# Patient Record
Sex: MALE | Employment: FULL TIME | URBAN - METROPOLITAN AREA
[De-identification: names, ages, dates, MRNs, and addresses within clinical notes are randomized per-mention and may not be internally consistent; named-entity substitution may affect disease eponyms.]

---

## 2018-09-13 ENCOUNTER — HOSPITAL ENCOUNTER (EMERGENCY)
Age: 48
Discharge: HOME | End: 2018-09-13
Attending: EMERGENCY MEDICINE
Payer: COMMERCIAL

## 2018-09-13 ENCOUNTER — APPOINTMENT (OUTPATIENT)
Dept: CT IMAGING | Age: 48
End: 2018-09-13
Attending: EMERGENCY MEDICINE
Payer: COMMERCIAL

## 2018-09-13 VITALS
TEMPERATURE: 97.7 F | OXYGEN SATURATION: 98 % | RESPIRATION RATE: 16 BRPM | DIASTOLIC BLOOD PRESSURE: 78 MMHG | SYSTOLIC BLOOD PRESSURE: 138 MMHG | WEIGHT: 180 LBS | BODY MASS INDEX: 25.77 KG/M2 | HEIGHT: 70 IN | HEART RATE: 65 BPM

## 2018-09-13 DIAGNOSIS — N20.0 KIDNEY STONE: Primary | ICD-10-CM

## 2018-09-13 LAB
ALBUMIN SERPL-MCNC: 3.8 G/DL (ref 3.5–5)
ALBUMIN/GLOB SERPL: 1.2 {RATIO} (ref 1.1–2.2)
ALP SERPL-CCNC: 72 U/L (ref 45–117)
ALT SERPL-CCNC: 27 U/L (ref 12–78)
ANION GAP SERPL CALC-SCNC: 11 MMOL/L (ref 5–15)
APPEARANCE UR: ABNORMAL
AST SERPL-CCNC: 16 U/L (ref 15–37)
BACTERIA URNS QL MICRO: NEGATIVE /HPF
BASOPHILS # BLD: 0.1 K/UL (ref 0–0.1)
BASOPHILS NFR BLD: 1 % (ref 0–1)
BILIRUB SERPL-MCNC: 0.2 MG/DL (ref 0.2–1)
BILIRUB UR QL: NEGATIVE
BUN SERPL-MCNC: 20 MG/DL (ref 6–20)
BUN/CREAT SERPL: 19 (ref 12–20)
CALCIUM SERPL-MCNC: 9.1 MG/DL (ref 8.5–10.1)
CHLORIDE SERPL-SCNC: 106 MMOL/L (ref 97–108)
CO2 SERPL-SCNC: 24 MMOL/L (ref 21–32)
COLOR UR: ABNORMAL
COMMENT, HOLDF: NORMAL
CREAT SERPL-MCNC: 1.04 MG/DL (ref 0.7–1.3)
DIFFERENTIAL METHOD BLD: NORMAL
EOSINOPHIL # BLD: 0.2 K/UL (ref 0–0.4)
EOSINOPHIL NFR BLD: 3 % (ref 0–7)
EPITH CASTS URNS QL MICRO: ABNORMAL /LPF
ERYTHROCYTE [DISTWIDTH] IN BLOOD BY AUTOMATED COUNT: 12.2 % (ref 11.5–14.5)
GLOBULIN SER CALC-MCNC: 3.2 G/DL (ref 2–4)
GLUCOSE SERPL-MCNC: 92 MG/DL (ref 65–100)
GLUCOSE UR STRIP.AUTO-MCNC: NEGATIVE MG/DL
HCT VFR BLD AUTO: 42.2 % (ref 36.6–50.3)
HGB BLD-MCNC: 13.9 G/DL (ref 12.1–17)
HGB UR QL STRIP: ABNORMAL
HYALINE CASTS URNS QL MICRO: ABNORMAL /LPF (ref 0–5)
IMM GRANULOCYTES # BLD: 0 K/UL (ref 0–0.04)
IMM GRANULOCYTES NFR BLD AUTO: 0 % (ref 0–0.5)
KETONES UR QL STRIP.AUTO: NEGATIVE MG/DL
LEUKOCYTE ESTERASE UR QL STRIP.AUTO: NEGATIVE
LYMPHOCYTES # BLD: 2.2 K/UL (ref 0.8–3.5)
LYMPHOCYTES NFR BLD: 37 % (ref 12–49)
MCH RBC QN AUTO: 30.8 PG (ref 26–34)
MCHC RBC AUTO-ENTMCNC: 32.9 G/DL (ref 30–36.5)
MCV RBC AUTO: 93.6 FL (ref 80–99)
MONOCYTES # BLD: 0.6 K/UL (ref 0–1)
MONOCYTES NFR BLD: 10 % (ref 5–13)
NEUTS SEG # BLD: 2.9 K/UL (ref 1.8–8)
NEUTS SEG NFR BLD: 49 % (ref 32–75)
NITRITE UR QL STRIP.AUTO: NEGATIVE
NRBC # BLD: 0 K/UL (ref 0–0.01)
NRBC BLD-RTO: 0 PER 100 WBC
PH UR STRIP: 5.5 [PH] (ref 5–8)
PLATELET # BLD AUTO: 246 K/UL (ref 150–400)
PMV BLD AUTO: 10.1 FL (ref 8.9–12.9)
POTASSIUM SERPL-SCNC: 4.1 MMOL/L (ref 3.5–5.1)
PROT SERPL-MCNC: 7 G/DL (ref 6.4–8.2)
PROT UR STRIP-MCNC: ABNORMAL MG/DL
RBC # BLD AUTO: 4.51 M/UL (ref 4.1–5.7)
RBC #/AREA URNS HPF: >100 /HPF (ref 0–5)
SAMPLES BEING HELD,HOLD: NORMAL
SODIUM SERPL-SCNC: 141 MMOL/L (ref 136–145)
SP GR UR REFRACTOMETRY: 1.02 (ref 1–1.03)
UR CULT HOLD, URHOLD: NORMAL
UROBILINOGEN UR QL STRIP.AUTO: 0.2 EU/DL (ref 0.2–1)
WBC # BLD AUTO: 5.9 K/UL (ref 4.1–11.1)
WBC URNS QL MICRO: ABNORMAL /HPF (ref 0–4)

## 2018-09-13 PROCEDURE — 36415 COLL VENOUS BLD VENIPUNCTURE: CPT | Performed by: EMERGENCY MEDICINE

## 2018-09-13 PROCEDURE — 81001 URINALYSIS AUTO W/SCOPE: CPT | Performed by: EMERGENCY MEDICINE

## 2018-09-13 PROCEDURE — 74011250636 HC RX REV CODE- 250/636: Performed by: EMERGENCY MEDICINE

## 2018-09-13 PROCEDURE — 99283 EMERGENCY DEPT VISIT LOW MDM: CPT

## 2018-09-13 PROCEDURE — 96374 THER/PROPH/DIAG INJ IV PUSH: CPT

## 2018-09-13 PROCEDURE — 74011250637 HC RX REV CODE- 250/637: Performed by: EMERGENCY MEDICINE

## 2018-09-13 PROCEDURE — 80053 COMPREHEN METABOLIC PANEL: CPT | Performed by: EMERGENCY MEDICINE

## 2018-09-13 PROCEDURE — 96361 HYDRATE IV INFUSION ADD-ON: CPT

## 2018-09-13 PROCEDURE — 96375 TX/PRO/DX INJ NEW DRUG ADDON: CPT

## 2018-09-13 PROCEDURE — 85025 COMPLETE CBC W/AUTO DIFF WBC: CPT | Performed by: EMERGENCY MEDICINE

## 2018-09-13 PROCEDURE — 74176 CT ABD & PELVIS W/O CONTRAST: CPT

## 2018-09-13 RX ORDER — ONDANSETRON 8 MG/1
8 TABLET, ORALLY DISINTEGRATING ORAL
Qty: 15 TAB | Refills: 0 | Status: SHIPPED | OUTPATIENT
Start: 2018-09-13 | End: 2018-10-16

## 2018-09-13 RX ORDER — TAMSULOSIN HYDROCHLORIDE 0.4 MG/1
0.4 CAPSULE ORAL DAILY
Qty: 15 CAP | Refills: 0 | Status: SHIPPED | OUTPATIENT
Start: 2018-09-13 | End: 2018-09-28

## 2018-09-13 RX ORDER — KETOROLAC TROMETHAMINE 30 MG/ML
30 INJECTION, SOLUTION INTRAMUSCULAR; INTRAVENOUS
Status: COMPLETED | OUTPATIENT
Start: 2018-09-13 | End: 2018-09-13

## 2018-09-13 RX ORDER — TAMSULOSIN HYDROCHLORIDE 0.4 MG/1
0.4 CAPSULE ORAL
Status: COMPLETED | OUTPATIENT
Start: 2018-09-13 | End: 2018-09-13

## 2018-09-13 RX ORDER — ONDANSETRON 2 MG/ML
8 INJECTION INTRAMUSCULAR; INTRAVENOUS
Status: COMPLETED | OUTPATIENT
Start: 2018-09-13 | End: 2018-09-13

## 2018-09-13 RX ORDER — HYDROCODONE BITARTRATE AND ACETAMINOPHEN 5; 325 MG/1; MG/1
1-2 TABLET ORAL
Qty: 20 TAB | Refills: 0 | Status: ON HOLD | OUTPATIENT
Start: 2018-09-13 | End: 2018-10-18 | Stop reason: SDUPTHER

## 2018-09-13 RX ADMIN — SODIUM CHLORIDE 1000 ML: 900 INJECTION, SOLUTION INTRAVENOUS at 02:02

## 2018-09-13 RX ADMIN — KETOROLAC TROMETHAMINE 30 MG: 30 INJECTION, SOLUTION INTRAMUSCULAR at 02:02

## 2018-09-13 RX ADMIN — ONDANSETRON 8 MG: 2 INJECTION INTRAMUSCULAR; INTRAVENOUS at 02:02

## 2018-09-13 RX ADMIN — TAMSULOSIN HYDROCHLORIDE 0.4 MG: 0.4 CAPSULE ORAL at 03:03

## 2018-09-13 NOTE — DISCHARGE INSTRUCTIONS
Kidney Stone: Care Instructions  Your Care Instructions    Kidney stones are formed when salts, minerals, and other substances normally found in the urine clump together. They can be as small as grains of sand or, rarely, as large as golf balls. While the stone is traveling through the ureter, which is the tube that carries urine from the kidney to the bladder, you will probably feel pain. The pain may be mild or very severe. You may also have some blood in your urine. As soon as the stone reaches the bladder, any intense pain should go away. If a stone is too large to pass on its own, you may need a medical procedure to help you pass the stone. The doctor has checked you carefully, but problems can develop later. If you notice any problems or new symptoms, get medical treatment right away. Follow-up care is a key part of your treatment and safety. Be sure to make and go to all appointments, and call your doctor if you are having problems. It's also a good idea to know your test results and keep a list of the medicines you take. How can you care for yourself at home? · Drink plenty of fluids, enough so that your urine is light yellow or clear like water. If you have kidney, heart, or liver disease and have to limit fluids, talk with your doctor before you increase the amount of fluids you drink. · Take pain medicines exactly as directed. Call your doctor if you think you are having a problem with your medicine. ¨ If the doctor gave you a prescription medicine for pain, take it as prescribed. ¨ If you are not taking a prescription pain medicine, ask your doctor if you can take an over-the-counter medicine. Read and follow all instructions on the label. · Your doctor may ask you to strain your urine so that you can collect your kidney stone when it passes. You can use a kitchen strainer or a tea strainer to catch the stone. Store it in a plastic bag until you see your doctor again.   Preventing future kidney stones  Some changes in your diet may help prevent kidney stones. Depending on the cause of your stones, your doctor may recommend that you:  · Drink plenty of fluids, enough so that your urine is light yellow or clear like water. If you have kidney, heart, or liver disease and have to limit fluids, talk with your doctor before you increase the amount of fluids you drink. · Limit coffee, tea, and alcohol. Also avoid grapefruit juice. · Do not take more than the recommended daily dose of vitamins C and D.  · Avoid antacids such as Gaviscon, Maalox, Mylanta, or Tums. · Limit the amount of salt (sodium) in your diet. · Eat a balanced diet that is not too high in protein. · Limit foods that are high in a substance called oxalate, which can cause kidney stones. These foods include dark green vegetables, rhubarb, chocolate, wheat bran, nuts, cranberries, and beans. When should you call for help? Call your doctor now or seek immediate medical care if:    · You cannot keep down fluids.     · Your pain gets worse.     · You have a fever or chills.     · You have new or worse pain in your back just below your rib cage (the flank area).     · You have new or more blood in your urine.    Watch closely for changes in your health, and be sure to contact your doctor if:    · You do not get better as expected. Where can you learn more? Go to http://derek-david.info/. Enter P714 in the search box to learn more about \"Kidney Stone: Care Instructions. \"  Current as of: May 12, 2017  Content Version: 11.7  © 9673-0470 [employer]. Care instructions adapted under license by [employer] (which disclaims liability or warranty for this information). If you have questions about a medical condition or this instruction, always ask your healthcare professional. [address] any warranty or liability for your use of this information.

## 2018-09-13 NOTE — ED NOTES
9802:  MD reviewed discharge instructions and options with patient and patient verbalized understanding. RN reviewed discharge instructions using teachback method. Pt ambulated to exit without difficulty and in no signs of acute distress escorted by self, and self will drive home. No complaints or needs expressed at this time. Patient was counseled on medications prescribed at discharge. VSS, verbalized relief from most intense pain. Patient to call PCP in the morning for appointment.

## 2018-09-13 NOTE — ED TRIAGE NOTES
Patient presents to the emergency department reporting left sided flank pain onset a couple of hours ago. Patient reports history of kidney stones. Patient reports nausea without vomiting. Patient denies urinary symptoms.

## 2018-10-16 RX ORDER — CITALOPRAM 20 MG/1
20 TABLET, FILM COATED ORAL DAILY
COMMUNITY

## 2018-10-18 ENCOUNTER — ANESTHESIA EVENT (OUTPATIENT)
Dept: SURGERY | Age: 48
End: 2018-10-18
Payer: COMMERCIAL

## 2018-10-18 ENCOUNTER — ANESTHESIA (OUTPATIENT)
Dept: SURGERY | Age: 48
End: 2018-10-18
Payer: COMMERCIAL

## 2018-10-18 ENCOUNTER — HOSPITAL ENCOUNTER (OUTPATIENT)
Age: 48
Setting detail: OUTPATIENT SURGERY
Discharge: HOME | End: 2018-10-18
Attending: UROLOGY | Admitting: UROLOGY
Payer: COMMERCIAL

## 2018-10-18 VITALS
SYSTOLIC BLOOD PRESSURE: 120 MMHG | WEIGHT: 169.97 LBS | DIASTOLIC BLOOD PRESSURE: 67 MMHG | HEIGHT: 69 IN | BODY MASS INDEX: 25.18 KG/M2 | HEART RATE: 63 BPM | OXYGEN SATURATION: 98 % | TEMPERATURE: 97.9 F | RESPIRATION RATE: 11 BRPM

## 2018-10-18 DIAGNOSIS — N20.0 KIDNEY STONE: ICD-10-CM

## 2018-10-18 PROCEDURE — 76210000021 HC REC RM PH II 0.5 TO 1 HR: Performed by: UROLOGY

## 2018-10-18 PROCEDURE — 77030032490 HC SLV COMPR SCD KNE COVD -B: Performed by: UROLOGY

## 2018-10-18 PROCEDURE — 76060000033 HC ANESTHESIA 1 TO 1.5 HR: Performed by: UROLOGY

## 2018-10-18 PROCEDURE — 74011250636 HC RX REV CODE- 250/636: Performed by: ANESTHESIOLOGY

## 2018-10-18 PROCEDURE — 77030020782 HC GWN BAIR PAWS FLX 3M -B

## 2018-10-18 PROCEDURE — 74011250637 HC RX REV CODE- 250/637: Performed by: UROLOGY

## 2018-10-18 PROCEDURE — 76010000161 HC OR TIME 1 TO 1.5 HR INTENSV-TIER 1: Performed by: UROLOGY

## 2018-10-18 PROCEDURE — 74011250636 HC RX REV CODE- 250/636: Performed by: UROLOGY

## 2018-10-18 PROCEDURE — 76210000016 HC OR PH I REC 1 TO 1.5 HR: Performed by: UROLOGY

## 2018-10-18 PROCEDURE — 74011250636 HC RX REV CODE- 250/636

## 2018-10-18 RX ORDER — SODIUM CHLORIDE 0.9 % (FLUSH) 0.9 %
5-10 SYRINGE (ML) INJECTION EVERY 8 HOURS
Status: DISCONTINUED | OUTPATIENT
Start: 2018-10-18 | End: 2018-10-18 | Stop reason: HOSPADM

## 2018-10-18 RX ORDER — SODIUM CHLORIDE 9 MG/ML
50 INJECTION, SOLUTION INTRAVENOUS CONTINUOUS
Status: DISCONTINUED | OUTPATIENT
Start: 2018-10-18 | End: 2018-10-18 | Stop reason: HOSPADM

## 2018-10-18 RX ORDER — PROPOFOL 10 MG/ML
INJECTION, EMULSION INTRAVENOUS AS NEEDED
Status: DISCONTINUED | OUTPATIENT
Start: 2018-10-18 | End: 2018-10-18 | Stop reason: HOSPADM

## 2018-10-18 RX ORDER — OXYCODONE AND ACETAMINOPHEN 5; 325 MG/1; MG/1
1 TABLET ORAL AS NEEDED
Status: DISCONTINUED | OUTPATIENT
Start: 2018-10-18 | End: 2018-10-18 | Stop reason: HOSPADM

## 2018-10-18 RX ORDER — MIDAZOLAM HYDROCHLORIDE 1 MG/ML
INJECTION, SOLUTION INTRAMUSCULAR; INTRAVENOUS AS NEEDED
Status: DISCONTINUED | OUTPATIENT
Start: 2018-10-18 | End: 2018-10-18

## 2018-10-18 RX ORDER — ONDANSETRON 2 MG/ML
INJECTION INTRAMUSCULAR; INTRAVENOUS AS NEEDED
Status: DISCONTINUED | OUTPATIENT
Start: 2018-10-18 | End: 2018-10-18 | Stop reason: HOSPADM

## 2018-10-18 RX ORDER — LIDOCAINE HYDROCHLORIDE 10 MG/ML
0.1 INJECTION, SOLUTION EPIDURAL; INFILTRATION; INTRACAUDAL; PERINEURAL AS NEEDED
Status: DISCONTINUED | OUTPATIENT
Start: 2018-10-18 | End: 2018-10-18 | Stop reason: HOSPADM

## 2018-10-18 RX ORDER — MIDAZOLAM HYDROCHLORIDE 1 MG/ML
1 INJECTION, SOLUTION INTRAMUSCULAR; INTRAVENOUS AS NEEDED
Status: DISCONTINUED | OUTPATIENT
Start: 2018-10-18 | End: 2018-10-18 | Stop reason: HOSPADM

## 2018-10-18 RX ORDER — LIDOCAINE HYDROCHLORIDE 20 MG/ML
INJECTION, SOLUTION EPIDURAL; INFILTRATION; INTRACAUDAL; PERINEURAL AS NEEDED
Status: DISCONTINUED | OUTPATIENT
Start: 2018-10-18 | End: 2018-10-18 | Stop reason: HOSPADM

## 2018-10-18 RX ORDER — DEXAMETHASONE SODIUM PHOSPHATE 4 MG/ML
INJECTION, SOLUTION INTRA-ARTICULAR; INTRALESIONAL; INTRAMUSCULAR; INTRAVENOUS; SOFT TISSUE AS NEEDED
Status: DISCONTINUED | OUTPATIENT
Start: 2018-10-18 | End: 2018-10-18 | Stop reason: HOSPADM

## 2018-10-18 RX ORDER — MIDAZOLAM HYDROCHLORIDE 1 MG/ML
0.5 INJECTION, SOLUTION INTRAMUSCULAR; INTRAVENOUS
Status: DISCONTINUED | OUTPATIENT
Start: 2018-10-18 | End: 2018-10-18 | Stop reason: HOSPADM

## 2018-10-18 RX ORDER — FENTANYL CITRATE 50 UG/ML
INJECTION, SOLUTION INTRAMUSCULAR; INTRAVENOUS AS NEEDED
Status: DISCONTINUED | OUTPATIENT
Start: 2018-10-18 | End: 2018-10-18 | Stop reason: HOSPADM

## 2018-10-18 RX ORDER — SODIUM CHLORIDE 0.9 % (FLUSH) 0.9 %
5-10 SYRINGE (ML) INJECTION AS NEEDED
Status: DISCONTINUED | OUTPATIENT
Start: 2018-10-18 | End: 2018-10-18 | Stop reason: HOSPADM

## 2018-10-18 RX ORDER — MIDAZOLAM HYDROCHLORIDE 1 MG/ML
INJECTION, SOLUTION INTRAMUSCULAR; INTRAVENOUS AS NEEDED
Status: DISCONTINUED | OUTPATIENT
Start: 2018-10-18 | End: 2018-10-18 | Stop reason: HOSPADM

## 2018-10-18 RX ORDER — HYDROCODONE BITARTRATE AND ACETAMINOPHEN 5; 325 MG/1; MG/1
1 TABLET ORAL
Qty: 30 TAB | Refills: 0 | Status: SHIPPED | OUTPATIENT
Start: 2018-10-18 | End: 2020-12-18

## 2018-10-18 RX ORDER — TAMSULOSIN HYDROCHLORIDE 0.4 MG/1
0.4 CAPSULE ORAL DAILY
Qty: 30 CAP | Refills: 3 | Status: SHIPPED | OUTPATIENT
Start: 2018-10-18 | End: 2020-12-18

## 2018-10-18 RX ORDER — SODIUM CHLORIDE 9 MG/ML
1000 INJECTION, SOLUTION INTRAVENOUS CONTINUOUS
Status: DISCONTINUED | OUTPATIENT
Start: 2018-10-18 | End: 2018-10-18 | Stop reason: HOSPADM

## 2018-10-18 RX ORDER — FENTANYL CITRATE 50 UG/ML
50 INJECTION, SOLUTION INTRAMUSCULAR; INTRAVENOUS AS NEEDED
Status: DISCONTINUED | OUTPATIENT
Start: 2018-10-18 | End: 2018-10-18 | Stop reason: HOSPADM

## 2018-10-18 RX ORDER — DIPHENHYDRAMINE HYDROCHLORIDE 50 MG/ML
12.5 INJECTION, SOLUTION INTRAMUSCULAR; INTRAVENOUS AS NEEDED
Status: DISCONTINUED | OUTPATIENT
Start: 2018-10-18 | End: 2018-10-18 | Stop reason: HOSPADM

## 2018-10-18 RX ORDER — HYDROCODONE BITARTRATE AND ACETAMINOPHEN 5; 325 MG/1; MG/1
TABLET ORAL
Status: DISCONTINUED
Start: 2018-10-18 | End: 2018-10-18 | Stop reason: HOSPADM

## 2018-10-18 RX ORDER — HYDROCODONE BITARTRATE AND ACETAMINOPHEN 5; 325 MG/1; MG/1
1 TABLET ORAL
Status: COMPLETED | OUTPATIENT
Start: 2018-10-18 | End: 2018-10-18

## 2018-10-18 RX ORDER — ONDANSETRON 2 MG/ML
4 INJECTION INTRAMUSCULAR; INTRAVENOUS AS NEEDED
Status: DISCONTINUED | OUTPATIENT
Start: 2018-10-18 | End: 2018-10-18 | Stop reason: HOSPADM

## 2018-10-18 RX ORDER — FENTANYL CITRATE 50 UG/ML
25 INJECTION, SOLUTION INTRAMUSCULAR; INTRAVENOUS
Status: DISCONTINUED | OUTPATIENT
Start: 2018-10-18 | End: 2018-10-18 | Stop reason: HOSPADM

## 2018-10-18 RX ORDER — MORPHINE SULFATE 4 MG/ML
2 INJECTION, SOLUTION INTRAMUSCULAR; INTRAVENOUS
Status: DISCONTINUED | OUTPATIENT
Start: 2018-10-18 | End: 2018-10-18 | Stop reason: HOSPADM

## 2018-10-18 RX ORDER — SODIUM CHLORIDE, SODIUM LACTATE, POTASSIUM CHLORIDE, CALCIUM CHLORIDE 600; 310; 30; 20 MG/100ML; MG/100ML; MG/100ML; MG/100ML
125 INJECTION, SOLUTION INTRAVENOUS CONTINUOUS
Status: DISCONTINUED | OUTPATIENT
Start: 2018-10-18 | End: 2018-10-18 | Stop reason: HOSPADM

## 2018-10-18 RX ORDER — CEFAZOLIN SODIUM/WATER 2 G/20 ML
2 SYRINGE (ML) INTRAVENOUS
Status: COMPLETED | OUTPATIENT
Start: 2018-10-18 | End: 2018-10-18

## 2018-10-18 RX ORDER — MORPHINE SULFATE 4 MG/ML
INJECTION, SOLUTION INTRAMUSCULAR; INTRAVENOUS AS NEEDED
Status: DISCONTINUED | OUTPATIENT
Start: 2018-10-18 | End: 2018-10-18 | Stop reason: HOSPADM

## 2018-10-18 RX ADMIN — MIDAZOLAM HYDROCHLORIDE 2 MG: 1 INJECTION, SOLUTION INTRAMUSCULAR; INTRAVENOUS at 15:14

## 2018-10-18 RX ADMIN — Medication 2 G: at 15:33

## 2018-10-18 RX ADMIN — MORPHINE SULFATE 2 MG: 4 INJECTION, SOLUTION INTRAMUSCULAR; INTRAVENOUS at 16:04

## 2018-10-18 RX ADMIN — SODIUM CHLORIDE, SODIUM LACTATE, POTASSIUM CHLORIDE, AND CALCIUM CHLORIDE 125 ML/HR: 600; 310; 30; 20 INJECTION, SOLUTION INTRAVENOUS at 13:48

## 2018-10-18 RX ADMIN — HYDROCODONE BITARTRATE AND ACETAMINOPHEN 1 TABLET: 5; 325 TABLET ORAL at 17:24

## 2018-10-18 RX ADMIN — ONDANSETRON 4 MG: 2 INJECTION INTRAMUSCULAR; INTRAVENOUS at 15:45

## 2018-10-18 RX ADMIN — DEXAMETHASONE SODIUM PHOSPHATE 4 MG: 4 INJECTION, SOLUTION INTRA-ARTICULAR; INTRALESIONAL; INTRAMUSCULAR; INTRAVENOUS; SOFT TISSUE at 15:36

## 2018-10-18 RX ADMIN — LIDOCAINE HYDROCHLORIDE 60 MG: 20 INJECTION, SOLUTION EPIDURAL; INFILTRATION; INTRACAUDAL; PERINEURAL at 15:30

## 2018-10-18 RX ADMIN — PROPOFOL 150 MG: 10 INJECTION, EMULSION INTRAVENOUS at 15:30

## 2018-10-18 RX ADMIN — FENTANYL CITRATE 50 MCG: 50 INJECTION, SOLUTION INTRAMUSCULAR; INTRAVENOUS at 15:14

## 2018-10-18 NOTE — PERIOP NOTES
D/c instructions and rx reviewed with and given to pt and his wife who both verbalize understanding of all.

## 2018-10-18 NOTE — BRIEF OP NOTE
BRIEF OPERATIVE NOTE    Date of Procedure: 10/18/2018   Preoperative Diagnosis: URETERAL CALCULOUS  Postoperative Diagnosis: URETERAL CALCULOUS    Procedure(s):  LEFT URETERAL LITHOTRIPSY EXTRACORPOREAL SHOCKWAVE ESWL  Surgeon(s) and Role:     * Sarah Do MD - Primary         Surgical Assistant: none    Surgical Staff:  Circ-1: Vishnu Albarado RN  Scrub Tech-1: Wayne Amanda  Event Time In Time Out   Incision Start 1547    Incision Close 1624      Anesthesia: General   Estimated Blood Loss: none  Specimens: * No specimens in log *   Findings: stone fragmented  Complications: none  Implants: * No implants in log *

## 2018-10-18 NOTE — DISCHARGE INSTRUCTIONS
****   NORCO TAKEN AT 5:30 PM   ****    Follow up with Dr. Núñez [facility] office to schedule a follow up appointment in 1 week       Lithotripsy: What to Expect at [address] is a way to treat kidney stones without surgery. It is also called extracorporeal shock wave lithotripsy, or ESWL. This treatment uses sound waves to break kidney stones into tiny pieces. These pieces can then pass out of the body in the urine. You may have a small amount of blood in your urine after this treatment. Your urine may be slightly pink or reddish. The blood in the urine often goes away after 2 days. You may have a plastic tube inside one of your ureters. Ureters are the tubes that connect the kidneys to the bladder. The plastic tube is called a stent. It takes urine from your kidney to your bladder. This lets the stone pass more easily. Your doctor may remove the stent in about a week or two. This care sheet gives you a general idea about how long it will take for you to recover. But each person recovers at a different pace. Follow the steps below to feel better as quickly as possible. How can you care for yourself at home? Activity    · Rest as much as you need to after you go home.     · You may do your regular activities. But avoid hard exercise or sports for a week. Wait until there is no blood in your urine and the stent is out. Diet    · You can eat your normal diet.     · Drink plenty of fluids, enough so that your urine is light yellow or clear like water. If you have kidney, heart, or liver disease and have to limit fluids, talk with your doctor before you increase the amount of fluids you drink. Medicines    · Your doctor will tell you if and when you can restart your medicines. He or she will also give you instructions about taking any new medicines.     · If you take blood thinners, such as warfarin (Coumadin), clopidogrel (Plavix), or aspirin, be sure to talk to your doctor.  He or she will tell you if and when to start taking those medicines again. Make sure that you understand exactly what your doctor wants you to do.     · Be safe with medicines. Read and follow all instructions on the label. ? If the doctor gave you a prescription medicine for pain, take it as prescribed. ? If you are not taking a prescription pain medicine, ask your doctor if you can take acetaminophen (Tylenol). Do not take ibuprofen (Advil, Motrin) or naproxen (Aleve), or similar medicines unless your doctor tells you to. ? Do not take two or more pain medicines at the same time unless the doctor told you to. Many pain medicines have acetaminophen, which is Tylenol. Too much acetaminophen (Tylenol) can be harmful. Other instructions    · Urinate through the strainer the doctor gives you. Save any stone pieces, including those that look like sand or gravel. Take these to your doctor. This will help your doctor find the cause of your stones. Follow-up care is a key part of your treatment and safety. Be sure to make and go to all appointments, and call your doctor if you are having problems. It's also a good idea to know your test results and keep a list of the medicines you take. When should you call for help? Call 911 anytime you think you may need emergency care. For example, call if:    · You passed out (lost consciousness).     · You have chest pain, are short of breath, or cough up blood.    Call your doctor now or seek immediate medical care if:    · You have pain that does not get better after you take pain medicine.     · You have new or more blood clots in your urine. (It is normal for the urine to be pink for a few days.)     · You cannot urinate.     · You have symptoms of a urinary tract infection. These may include:  ? Pain or burning when you urinate. ? A frequent need to urinate without being able to pass much urine.   ? Pain in the flank, which is just below the rib cage and above the waist on either side of the back. ? Blood in the urine. ? A fever.     · You are sick to your stomach or cannot drink fluids.     · You have signs of a blood clot in your leg (called a deep vein thrombosis), such as:  ? Pain in the calf, back of the knee, thigh, or groin. ? Redness and swelling in your leg.    Watch closely for any changes in your health, and be sure to contact your doctor if you have any problems. Where can you learn more? Go to http://derek-david.info/. Enter U545 in the search box to learn more about \"Lithotripsy: What to Expect at Home. \"  Current as of: March 15, 2018  Content Version: 11.8  © 6497-4613 [employer], [employer]. Care instructions adapted under license by [employer] (which disclaims liability or warranty for this information). If you have questions about a medical condition or this instruction, always ask your healthcare professional. [place] [address] any warranty or liability for your use of this information.

## 2018-10-18 NOTE — ANESTHESIA POSTPROCEDURE EVALUATION
+Post-Anesthesia Evaluation and Assessment Patient: Louise Sanders MRN: [id number]  SSN: [id number] YOB: 1970  Age: 50 y.o. Sex: male Cardiovascular Function/Vital Signs /67 (BP 1 Location: Right arm, BP Patient Position: At rest)   Pulse 63   Temp 36.6 °C (97.9 °F)   Resp 11   Ht 5' 9\" (1.753 m)   Wt 77.1 kg (169 lb 15.6 oz)   SpO2 98%   BMI 25.10 kg/m² Patient is status post Procedure(s): LEFT URETERAL LITHOTRIPSY EXTRACORPOREAL SHOCKWAVE ESWL. Nausea/Vomiting: Controlled. Postoperative hydration reviewed and adequate. Pain: 
Pain Scale 1: Numeric (0 - 10) (10/18/18 1700) Pain Intensity 1: 0 (10/18/18 1700) Managed. Neurological Status:  
Neuro (WDL): Within Defined Limits (10/18/18 1700) At baseline. Mental Status and Level of Consciousness: Arousable. Pulmonary Status:  
O2 Device: Room air (10/18/18 1730) Adequate oxygenation and airway patent. Complications related to anesthesia: None Post-anesthesia assessment completed. No concerns. Signed By: Meaghan House MD  
 October 18, 2018 Procedure(s): LEFT URETERAL LITHOTRIPSY EXTRACORPOREAL SHOCKWAVE ESWL. 
 
<BSHSIANPOST> Visit Vitals /67 (BP 1 Location: Right arm, BP Patient Position: At rest) Pulse 63 Temp 36.6 °C (97.9 °F) Resp 11 Ht 5' 9\" (1.753 m) Wt 77.1 kg (169 lb 15.6 oz) SpO2 98% BMI 25.10 kg/m²

## 2018-10-19 NOTE — OP NOTES
[address]   OPERATIVE REPORT    Name:LUCY Sampson  MR#: [id number]  : 1970  ACCOUNT #: [de-identified]   DATE OF SERVICE: 10/18/2018    PREOPERATIVE DIAGNOSIS:  Left ureteral stone. POSTOPERATIVE DIAGNOSIS:  Left ureteral stone. PROCEDURE PERFORMED:  Left extracorporeal shock wave lithotripsy. SURGEON:  Aleksandra Aranda MD    ANESTHESIA:  General.    COMPLICATIONS:  None. ESTIMATED BLOOD LOSS:  None. TUBES AND DRAINS:  None. SPECIMENS REMOVED:  None. ASSISTANT:  None. IMPLANTS:  none    INDICATION FOR THE PROCEDURE:  The patient is a 19-year-old gentleman who was found to have a 7 mm left mid ureteral stone for which he was seen by Dr. John Leonard in the office. He had an initial CT scan about a month ago and subsequent KUB showed some distal migration. However, the stone was still present. After discussion of various options, he elected to proceed with ESWL. I met with him in the preoperative area. He still has pain off and on. Last episode was yesterday. He would like to proceed with ESWL after consideration of pros and cons and various side effects and risks. DETAILS OF PROCEDURE:  After informed consent was obtained, he was brought to the OR and placed supine on the table. General anesthesia was smoothly induced. A multidisciplinary timeout was performed and perioperative antibiotics were administered. Of note, prior to him undergoing anesthesia, we had performed simulation and we were able to localize the stone after comparison of his fluoroscopy images with a previous KUB at [facility] u[facility] and previous CT scan at [facility].  The stone appears to have migrated somewhat more distally and was overlying sacral ala. Once the stone was localized, he was anesthetized and we began shockwave lithotripsy. Starting at a level of 1, we ramped up shock to level 6 and we gave a total of 2500 shocks. With this, the stone was smudged nicely.   No further radiopaque shadow was seen in the area after the procedure. He tolerated the procedure well. He was extubated and then transferred to the PACU in stable condition. He will keep his followup in the office as planned.       MD TED Lundberg / RN  D: 10/18/2018 16:44     T: 10/19/2018 01:49  JOB #: [id number]

## 2020-12-18 ENCOUNTER — OFFICE VISIT (OUTPATIENT)
Dept: URGENT CARE | Age: 50
End: 2020-12-18
Payer: COMMERCIAL

## 2020-12-18 VITALS — TEMPERATURE: 98 F | RESPIRATION RATE: 16 BRPM | HEART RATE: 60 BPM | OXYGEN SATURATION: 98 %

## 2020-12-18 DIAGNOSIS — R05.9 COUGH: ICD-10-CM

## 2020-12-18 DIAGNOSIS — R09.81 NASAL CONGESTION: Primary | ICD-10-CM

## 2020-12-18 DIAGNOSIS — R19.7 DIARRHEA, UNSPECIFIED TYPE: ICD-10-CM

## 2020-12-18 DIAGNOSIS — Z11.59 SCREENING FOR VIRAL DISEASE: ICD-10-CM

## 2020-12-18 PROCEDURE — S9083 URGENT CARE CENTER GLOBAL: HCPCS | Performed by: FAMILY MEDICINE

## 2020-12-18 NOTE — PROGRESS NOTES
This patient was seen at [address] [place] [address] [facility]gent Care while in their vehicle due to COVID-19 pandemic with PPE and focused examination in order to decrease community viral transmission. The patient/guardian gave verbal consent to treat. Shelbi Mejias is a 48 y.o. male who presents with nasal congestion, cough x 2 days. Also had 1 bout of diarrhea yesterday. No known COVID-19 contacts. Denies fever, SOB. The history is provided by the patient.         Past Medical History:   Diagnosis Date    Anxiety     GERD (gastroesophageal reflux disease)     Kidney stones     Psychiatric disorder     Depression        Past Surgical History:   Procedure Laterality Date    HX TONSILLECTOMY      HX WISDOM TEETH EXTRACTION           Family History   Problem Relation Age of Onset    Cancer Mother         BREAST    Other Father         IBS    Cancer Maternal Grandfather         PROSTATE    Diabetes Maternal Grandfather     Alcohol abuse Paternal Grandmother     Alcohol abuse Paternal Grandfather     Psychiatric Disorder Daughter         ANXIETY    Anesth Problems Neg Hx         Social History     Socioeconomic History    Marital status:      Spouse name: Not on file    Number of children: Not on file    Years of education: Not on file    Highest education level: Not on file   Occupational History    Not on file   Social Needs    Financial resource strain: Not on file    Food insecurity     Worry: Not on file     Inability: Not on file    Transportation needs     Medical: Not on file     Non-medical: Not on file   Tobacco Use    Smoking status: Never Smoker    Smokeless tobacco: Former User   Substance and Sexual Activity    Alcohol use: Yes     Comment: BEER DAILY    Drug use: No    Sexual activity: Not on file   Lifestyle    Physical activity     Days per week: Not on file     Minutes per session: Not on file    Stress: Not on file   Relationships    Social connections Talks on phone: Not on file     Gets together: Not on file     Attends Zoroastrianism service: Not on file     Active member of club or organization: Not on file     Attends meetings of clubs or organizations: Not on file     Relationship status: Not on file    Intimate partner violence     Fear of current or ex partner: Not on file     Emotionally abused: Not on file     Physically abused: Not on file     Forced sexual activity: Not on file   Other Topics Concern    Not on file   Social History Narrative    Not on file                ALLERGIES: Patient has no known allergies. Review of Systems   Constitutional: Negative for activity change, appetite change, chills and fever. HENT: Positive for congestion. Negative for rhinorrhea and sore throat. Respiratory: Positive for cough. Negative for shortness of breath and wheezing. Cardiovascular: Negative for chest pain. Gastrointestinal: Positive for diarrhea. Negative for abdominal pain, nausea and vomiting. Musculoskeletal: Negative for myalgias. Neurological: Negative for headaches. Vitals:    12/18/20 0917   Pulse: 60   Resp: 16   Temp: 98 °F (36.7 °C)   SpO2: 98%       Physical Exam  Vitals signs and nursing note reviewed. Constitutional:       General: He is not in acute distress. Appearance: He is well-developed. He is not diaphoretic. Pulmonary:      Effort: Pulmonary effort is normal. No respiratory distress. Breath sounds: Normal breath sounds. No stridor. No wheezing, rhonchi or rales. Neurological:      Mental Status: He is alert. Psychiatric:         Behavior: Behavior normal.         Thought Content: Thought content normal.         Judgment: Judgment normal.         MDM    ICD-10-CM ICD-9-CM   1. Nasal congestion  R09.81 478.19   2. Cough  R05 786.2   3. Diarrhea, unspecified type  R19.7 787.91   4.  Screening for viral disease  Z11.59 V73.99       Orders Placed This Encounter    NOVEL CORONAVIRUS (COVID-19) Scheduling Instructions:      1) Due to current limited availability of the COVID-19 PCR test, tests will be prioritized and may not be completed.              2) Order only if the test result will change clinical management or necessary for a return to mission-critical employment decision.              3) Print and instruct patient to adhere to CDC home isolation program. (Link Above)              4) Set up or refer patient for a monitoring program.              5) Have patient sign up for and leverage MyChart (if not previously done). Order Specific Question:   Is this test for diagnosis or screening? Answer:   Diagnosis of ill patient     Order Specific Question:   Symptomatic for COVID-19 as defined by CDC? Answer:   Yes     Order Specific Question:   Date of Symptom Onset     Answer:   12/16/2020     Order Specific Question:   Hospitalized for COVID-19? Answer:   No     Order Specific Question:   Admitted to ICU for COVID-19? Answer:   No     Order Specific Question:   Employed in healthcare setting? Answer:   No     Order Specific Question:   Resident in a congregate (group) care setting? Answer:   No     Order Specific Question:   Previously tested for COVID-19? Answer:   No        Quarantine  Deep breathing exercises, ambulation      If signs and symptoms become worse the pt is to go to the ER.          Procedures

## 2020-12-21 LAB — SARS-COV-2, NAA: NOT DETECTED

## 2021-09-21 ENCOUNTER — OFFICE VISIT (OUTPATIENT)
Dept: URGENT CARE | Age: 51
End: 2021-09-21
Payer: COMMERCIAL

## 2021-09-21 VITALS — OXYGEN SATURATION: 96 % | HEART RATE: 74 BPM | TEMPERATURE: 98.3 F | RESPIRATION RATE: 16 BRPM

## 2021-09-21 DIAGNOSIS — R09.81 NASAL CONGESTION: Primary | ICD-10-CM

## 2021-09-21 DIAGNOSIS — Z11.59 SCREENING FOR VIRAL DISEASE: ICD-10-CM

## 2021-09-21 LAB — SARS-COV-2 POC: NEGATIVE

## 2021-09-21 PROCEDURE — 87426 SARSCOV CORONAVIRUS AG IA: CPT | Performed by: FAMILY MEDICINE

## 2021-09-21 PROCEDURE — S9083 URGENT CARE CENTER GLOBAL: HCPCS | Performed by: FAMILY MEDICINE

## 2021-09-21 NOTE — PROGRESS NOTES
This patient was seen at [address] [place] [address] Urgent Care while in their vehicle due to COVID-19 pandemic with PPE and focused examination in order to decrease community viral transmission. The patient/guardian gave verbal consent to treat. Claudia Pittman is a 46 y.o. male who presents for COVID-19 testing. Possible exposure. Reports nasal congestion x 5-6 days. Denies cough, fever, SOB, N/V/D. Eating/drinking well. The history is provided by the patient.         Past Medical History:   Diagnosis Date    Anxiety     GERD (gastroesophageal reflux disease)     Kidney stones     Psychiatric disorder     Depression        Past Surgical History:   Procedure Laterality Date    HX TONSILLECTOMY      HX WISDOM TEETH EXTRACTION           Family History   Problem Relation Age of Onset    Cancer Mother         BREAST    Other Father         IBS    Cancer Maternal Grandfather         PROSTATE    Diabetes Maternal Grandfather     Alcohol abuse Paternal Grandmother     Alcohol abuse Paternal Grandfather     Psychiatric Disorder Daughter         ANXIETY    Anesth Problems Neg Hx         Social History     Socioeconomic History    Marital status:      Spouse name: Not on file    Number of children: Not on file    Years of education: Not on file    Highest education level: Not on file   Occupational History    Not on file   Tobacco Use    Smoking status: Never Smoker    Smokeless tobacco: Former User   Substance and Sexual Activity    Alcohol use: Yes     Comment: BEER DAILY    Drug use: No    Sexual activity: Not on file   Other Topics Concern    Not on file   Social History Narrative    Not on file     Social Determinants of Health     Financial Resource Strain:     Difficulty of Paying Living Expenses:    Food Insecurity:     Worried About Running Out of Food in the Last Year:     [address] in the Last Year:    Transportation Needs:     Lack of Transportation (Medical):  Lack of Transportation (Non-Medical):    Physical Activity:     Days of Exercise per Week:     Minutes of Exercise per Session:    Stress:     Feeling of Stress :    Social Connections:     Frequency of Communication with Friends and Family:     Frequency of Social Gatherings with Friends and Family:     Attends Gnosticist Services:     Active Member of Clubs or Organizations:     Attends Club or Organization Meetings:     Marital Status:    Intimate Partner Violence:     Fear of Current or Ex-Partner:     Emotionally Abused:     Physically Abused:     Sexually Abused: ALLERGIES: Patient has no known allergies. Review of Systems   Constitutional: Negative for activity change, appetite change and fever. HENT: Positive for congestion. Respiratory: Negative for cough and shortness of breath. Gastrointestinal: Negative for diarrhea, nausea and vomiting. Vitals:    09/21/21 0856   Pulse: 74   Resp: 16   Temp: 98.3 °F (36.8 °C)   SpO2: 96%       Physical Exam  Vitals and nursing note reviewed. Constitutional:       General: He is not in acute distress. Appearance: He is well-developed. He is not diaphoretic. Pulmonary:      Effort: Pulmonary effort is normal. No respiratory distress. Breath sounds: Normal breath sounds. No stridor. No wheezing, rhonchi or rales. Neurological:      Mental Status: He is alert. Psychiatric:         Behavior: Behavior normal.         Thought Content: Thought content normal.         Judgment: Judgment normal.         MDM    ICD-10-CM ICD-9-CM   1. Nasal congestion  R09.81 478.19   2.  Screening for viral disease  Z11.59 V73.99       Orders Placed This Encounter    NOVEL CORONAVIRUS (COVID-19)     Scheduling Instructions:      1) Due to current limited availability of the COVID-19 PCR test, tests will be prioritized and may not be completed.              2) Order only if the test result will change clinical management or necessary for a return to mission-critical employment decision.              3) Print and instruct patient to adhere to CDC home isolation program. (Link Above)              4) Set up or refer patient for a monitoring program.              5) Have patient sign up for and leverage MyChart (if not previously done). Order Specific Question:   Is this test for diagnosis or screening? Answer:   Diagnosis of ill patient     Order Specific Question:   Symptomatic for COVID-19 as defined by CDC? Answer:   Yes     Order Specific Question:   Date of Symptom Onset     Answer:   9/17/2021     Order Specific Question:   Hospitalized for COVID-19? Answer:   No     Order Specific Question:   Admitted to ICU for COVID-19? Answer:   No     Order Specific Question:   Employed in healthcare setting? Answer:   Unknown     Order Specific Question:   Resident in a congregate (group) care setting? Answer:   No     Order Specific Question:   Previously tested for COVID-19? Answer: Yes    AMB POC SARS-COV-2 ANTIGEN     Order Specific Question:   Is this test for diagnosis or screening? Answer:   Diagnosis of ill patient     Order Specific Question:   Symptomatic for COVID-19 as defined by CDC? Answer:   Yes     Order Specific Question:   Date of Symptom Onset     Answer:   9/17/2021     Order Specific Question:   Hospitalized for COVID-19? Answer:   No     Order Specific Question:   Admitted to ICU for COVID-19? Answer:   No     Order Specific Question:   Employed in healthcare setting? Answer:   Unknown     Order Specific Question:   Resident in a congregate (group) care setting? Answer:   No     Order Specific Question:   Previously tested for COVID-19?      Answer:   Yes      Quarantine, await PCR results  Deep breathing exercises, ambulation  Tylenol prn  Increase fluids with electrolytes if decreased PO intake  MyChart: active  Provider will call if PCR COVID-19 test is positive If signs and symptoms become worse the pt is to go to the ER.      Results for orders placed or performed in visit on 09/21/21   AMB POC SARS-COV-2   Result Value Ref Range    SARS-COV-2 POC Negative Negative       Procedures

## 2021-09-23 LAB
SARS-COV-2, NAA 2 DAY TAT: NORMAL
SARS-COV-2, NAA: NOT DETECTED

## 2022-05-10 ENCOUNTER — OFFICE VISIT (OUTPATIENT)
Dept: URGENT CARE | Age: 52
End: 2022-05-10
Payer: COMMERCIAL

## 2022-05-10 VITALS
DIASTOLIC BLOOD PRESSURE: 86 MMHG | TEMPERATURE: 98.2 F | OXYGEN SATURATION: 100 % | HEART RATE: 59 BPM | SYSTOLIC BLOOD PRESSURE: 141 MMHG | BODY MASS INDEX: 27.91 KG/M2 | WEIGHT: 189 LBS | RESPIRATION RATE: 16 BRPM

## 2022-05-10 DIAGNOSIS — H10.33 ACUTE CONJUNCTIVITIS OF BOTH EYES, UNSPECIFIED ACUTE CONJUNCTIVITIS TYPE: Primary | ICD-10-CM

## 2022-05-10 PROCEDURE — 99213 OFFICE O/P EST LOW 20 MIN: CPT | Performed by: FAMILY MEDICINE

## 2022-05-10 RX ORDER — GENTAMICIN SULFATE 3 MG/ML
1 SOLUTION/ DROPS OPHTHALMIC EVERY 4 HOURS
Qty: 5 ML | Refills: 0 | Status: SHIPPED | OUTPATIENT
Start: 2022-05-10 | End: 2022-08-23 | Stop reason: ALTCHOICE

## 2022-05-10 NOTE — PATIENT INSTRUCTIONS
Pinkeye: Care Instructions  Overview     Pinkeye is redness and swelling of the eye surface and the conjunctiva (the lining of the eyelid and the covering of the white part of the eye). Pinkeye is also called conjunctivitis. Pinkeye is often caused by infection with bacteria or a virus. Dry air, allergies, smoke, and chemicals are other common causes. Pinkeye often gets better on its own in 7 to 10 days. Antibiotics only help if the pinkeye is caused by bacteria. Pinkeye caused by infection spreads easily. If an allergy or chemical is causing pinkeye, it will not go away unless you can avoid whatever is causing it. Follow-up care is a key part of your treatment and safety. Be sure to make and go to all appointments, and call your doctor if you are having problems. It's also a good idea to know your test results and keep a list of the medicines you take. How can you care for yourself at home? · Wash your hands often. Always wash them before and after you treat pinkeye or touch your eyes or face. · Use moist cotton or a clean, wet cloth to remove crust. Wipe from the inside corner of the eye to the outside. Use a clean part of the cloth for each wipe. · Put cold or warm wet cloths on your eye a few times a day if the eye hurts. · Do not wear contact lenses or eye makeup until the pinkeye is gone. Throw away any eye makeup you were using when you got pinkeye. Clean your contacts and storage case. If you wear disposable contacts, use a new pair when your eye has cleared and it is safe to wear contacts again. · If the doctor gave you antibiotic ointment or eyedrops, use them as directed. Use the medicine for as long as instructed, even if your eye starts looking better soon. Keep the bottle tip clean, and do not let it touch the eye area. · To put in eyedrops or ointment:  ? Tilt your head back, and pull your lower eyelid down with one finger. ? Drop or squirt the medicine inside the lower lid.   ? Close your eye for 30 to 60 seconds to let the drops or ointment move around. ? Do not touch the ointment or dropper tip to your eyelashes or any other surface. · Do not share towels, pillows, or washcloths while you have pinkeye. When should you call for help? Call your doctor now or seek immediate medical care if:    · You have pain in your eye, not just irritation on the surface.     · You have a change in vision or loss of vision.     · You have an increase in discharge from the eye.     · Your eye has not started to improve or begins to get worse within 48 hours after you start using antibiotics.     · Pinkeye lasts longer than 7 days. Watch closely for changes in your health, and be sure to contact your doctor if you have any problems. Where can you learn more? Go to http://www.gray.com/  Enter Y392 in the search box to learn more about \"Pinkeye: Care Instructions. \"  Current as of: July 1, 2021               Content Version: 13.2  © 2006-2022 [employer]. Care instructions adapted under license by [employer] (which disclaims liability or warranty for this information). If you have questions about a medical condition or this instruction, always ask your healthcare professional. [address] any warranty or liability for your use of this information.

## 2022-05-10 NOTE — PROGRESS NOTES
Red Eye  This is a new problem. The current episode started more than 1 week ago. The problem occurs daily. The problem has not changed since onset. Associated symptoms comments: Burning sensation and pain in eyes  Redness- mild crusting - no drainage     No cold sxs  Denies dry eyes. Nothing aggravates the symptoms. Nothing relieves the symptoms. He has tried nothing for the symptoms. Past Medical History:   Diagnosis Date    Anxiety     GERD (gastroesophageal reflux disease)     Kidney stones     Psychiatric disorder     Depression        Past Surgical History:   Procedure Laterality Date    HX TONSILLECTOMY      HX WISDOM TEETH EXTRACTION           Family History   Problem Relation Age of Onset    Cancer Mother         BREAST    Other Father         IBS    Cancer Maternal Grandfather         PROSTATE    Diabetes Maternal Grandfather     Alcohol abuse Paternal Grandmother     Alcohol abuse Paternal Grandfather     Psychiatric Disorder Daughter         ANXIETY    Anesth Problems Neg Hx         Social History     Socioeconomic History    Marital status:      Spouse name: Not on file    Number of children: Not on file    Years of education: Not on file    Highest education level: Not on file   Occupational History    Not on file   Tobacco Use    Smoking status: Never Smoker    Smokeless tobacco: Former User   Substance and Sexual Activity    Alcohol use: Yes     Comment: BEER DAILY    Drug use: No    Sexual activity: Not on file   Other Topics Concern    Not on file   Social History Narrative    Not on file     Social Determinants of Health     Financial Resource Strain:     Difficulty of Paying Living Expenses: Not on file   Food Insecurity:     Worried About Running Out of Food in the Last Year: Not on file    [place]iko of Food in the Last Year: Not on file   Transportation Needs:     Lack of Transportation (Medical):  Not on file    Lack of Transportation (Non-Medical): Not on file   Physical Activity:     Days of Exercise per Week: Not on file    Minutes of Exercise per Session: Not on file   Stress:     Feeling of Stress : Not on file   Social Connections:     Frequency of Communication with Friends and Family: Not on file    Frequency of Social Gatherings with Friends and Family: Not on file    Attends Jain Services: Not on file    Active Member of [address] [place] [address] or Organizations: Not on file    Attends Club or Organization Meetings: Not on file    Marital Status: Not on file   Intimate Partner Violence:     Fear of Current or Ex-Partner: Not on file    Emotionally Abused: Not on file    Physically Abused: Not on file    Sexually Abused: Not on file   Housing Stability:     Unable to Pay for Housing in the Last Year: Not on file    Number of [place] in the Last Year: Not on file    Unstable Housing in the Last Year: Not on file                ALLERGIES: Patient has no known allergies. Review of Systems   Eyes: Positive for pain, redness and itching. Negative for discharge. All other systems reviewed and are negative. Vitals:    05/10/22 0907   BP: (!) 141/86   Pulse: (!) 59   Resp: 16   Temp: 98.2 °F (36.8 °C)   SpO2: 100%   Weight: 189 lb (85.7 kg)       Physical Exam  Vitals and nursing note reviewed. Eyes:      General:         Right eye: No discharge or hordeolum. Left eye: No discharge or hordeolum. Extraocular Movements: Extraocular movements intact. Conjunctiva/sclera:      Right eye: Right conjunctiva is injected. Left eye: Left conjunctiva is injected. MDM    Procedures      ICD-10-CM ICD-9-CM    1. Acute conjunctivitis of both eyes, unspecified acute conjunctivitis type  H10.33 372.00      Medications Ordered Today   Medications    gentamicin (GARAMYCIN) 0.3 % ophthalmic solution     Sig: Administer 1 Drop to both eyes every four (4) hours.      Dispense:  5 mL     Refill:  0     No results found for any visits on 05/10/22. The patients condition was discussed with the patient and they understand. The patient is to follow up with primary care doctor. If signs and symptoms become worse the pt is to go to the ER. The patient is to take medications as prescribed.

## 2022-08-23 ENCOUNTER — OFFICE VISIT (OUTPATIENT)
Dept: PRIMARY CARE CLINIC | Age: 52
End: 2022-08-23
Payer: COMMERCIAL

## 2022-08-23 VITALS
WEIGHT: 184.2 LBS | TEMPERATURE: 98 F | SYSTOLIC BLOOD PRESSURE: 130 MMHG | RESPIRATION RATE: 16 BRPM | OXYGEN SATURATION: 98 % | HEART RATE: 68 BPM | DIASTOLIC BLOOD PRESSURE: 92 MMHG | HEIGHT: 69 IN | BODY MASS INDEX: 27.28 KG/M2

## 2022-08-23 DIAGNOSIS — B36.0 TINEA VERSICOLOR: Primary | ICD-10-CM

## 2022-08-23 PROCEDURE — 99203 OFFICE O/P NEW LOW 30 MIN: CPT | Performed by: NURSE PRACTITIONER

## 2022-08-23 RX ORDER — ITRACONAZOLE 100 MG/1
200 CAPSULE ORAL DAILY
Qty: 14 CAPSULE | Refills: 0 | Status: SHIPPED | OUTPATIENT
Start: 2022-08-23 | End: 2022-08-30

## 2022-08-23 NOTE — PROGRESS NOTES
Chief Complaint   Patient presents with    Rash     States he has Tinea versicolor since he was 25 ([place] Flare-up X 3 weeks currently-states creams do not help and has used oral itraconazole 200mg daily X 5 days.      Visit Vitals  BP (!) 130/92   Pulse 68   Temp 98 °F (36.7 °C) (Temporal)   Resp 16   Ht 5' 9\" (1.753 m)   Wt 184 lb 3.2 oz (83.6 kg)   SpO2 98%   BMI 27.20 kg/m²

## 2022-08-23 NOTE — PROGRESS NOTES
HISTORY OF PRESENT ILLNESS  Uriel Adam III is a 46 y.o. male. Patient here with rash on chest and back x 3 weeks. Reports a history of tinea versicolor. Reports no fever, no vesicles. Has treated with cream in the past   Last year last  Uses selsen blue. Does help. Topical antifungals. \"Do not work\"     Reports last physical normal with normal labs. Does not have a PCP. Wife  is an internist.   Past Medical History:   Diagnosis Date    Anxiety     GERD (gastroesophageal reflux disease)     Kidney stones     Psychiatric disorder     Depression     Past Surgical History:   Procedure Laterality Date    HX TONSILLECTOMY      HX WISDOM TEETH EXTRACTION         Review of Systems   Constitutional:  Negative for fever and malaise/fatigue. HENT:  Negative for sore throat. Respiratory:  Negative for cough. Gastrointestinal:  Negative for nausea and vomiting. Musculoskeletal:  Negative for myalgias. Skin:  Positive for itching and rash. Neurological:  Negative for dizziness and headaches. All other systems reviewed and are negative. Physical Exam  Vitals and nursing note reviewed. Constitutional:       General: He is not in acute distress. Appearance: Normal appearance. He is normal weight. HENT:      Head: Normocephalic and atraumatic. Cardiovascular:      Rate and Rhythm: Normal rate. Pulses: Normal pulses. Pulmonary:      Effort: Pulmonary effort is normal.   Musculoskeletal:      Cervical back: Normal range of motion. Skin:     General: Skin is warm. Findings: Rash present. Rash is macular. Comments: The macules, patches, and thin plaques are  hypopigmented, hyperpigmented, or mildly erythematous located on back and chest.   Neurological:      General: No focal deficit present. Mental Status: He is alert. ASSESSMENT and PLAN    ICD-10-CM ICD-9-CM    1. Tinea versicolor  B36.0 111.0         Encounter Diagnoses   Name Primary?     Tinea versicolor Yes     Orders Placed This Encounter    itraconazole (SPORONAX) 100 mg capsule   Continue using Selsen blue  Recommend PC for health maintenance and physical  Medications as directed  Take with food. Complete entire course of prescription. Follow plan of care as discussed.   Signed By: LEEANN Desai     August 23, 2022

## 2023-04-21 ENCOUNTER — OFFICE VISIT (OUTPATIENT)
Dept: INTERNAL MEDICINE CLINIC | Age: 53
End: 2023-04-21

## 2023-04-21 VITALS
WEIGHT: 180 LBS | HEART RATE: 63 BPM | BODY MASS INDEX: 26.66 KG/M2 | TEMPERATURE: 98.2 F | DIASTOLIC BLOOD PRESSURE: 85 MMHG | HEIGHT: 69 IN | RESPIRATION RATE: 18 BRPM | OXYGEN SATURATION: 99 % | SYSTOLIC BLOOD PRESSURE: 134 MMHG

## 2023-04-21 DIAGNOSIS — Z00.00 ROUTINE PHYSICAL EXAMINATION: ICD-10-CM

## 2023-04-21 DIAGNOSIS — F41.9 ANXIETY: ICD-10-CM

## 2023-04-21 DIAGNOSIS — Z00.00 ROUTINE PHYSICAL EXAMINATION: Primary | ICD-10-CM

## 2023-04-21 DIAGNOSIS — Z23 ENCOUNTER FOR IMMUNIZATION: ICD-10-CM

## 2023-04-21 DIAGNOSIS — F33.42 RECURRENT MAJOR DEPRESSIVE DISORDER, IN FULL REMISSION (HCC): ICD-10-CM

## 2023-04-21 DIAGNOSIS — Z12.11 COLON CANCER SCREENING: ICD-10-CM

## 2023-04-21 RX ORDER — SERTRALINE HYDROCHLORIDE 50 MG/1
TABLET, FILM COATED ORAL
Qty: 85 TABLET | Refills: 0 | Status: SHIPPED | OUTPATIENT
Start: 2023-04-21 | End: 2023-07-20

## 2023-04-21 NOTE — ASSESSMENT & PLAN NOTE
This is a new problem to me but a chronic issue, life long issue, and not as well controlled off medication, I reviewed treatment options with him, I reviewed life style changes to help improve mood, following changes were made today: will start on sertraline 50mg. Reviewed expectations and he will update me if any concerns. He was not sure if Celexa was the right medication so will try a different SSRI.

## 2023-04-21 NOTE — ASSESSMENT & PLAN NOTE
This is a chronic issue but new to me, it is not as well controlled off medications, reviewed treatment options with him, reviewed life style changes to help improve mood, reviewed role of daily vs prn meds, following changes were made today: will start on sertraline 50mg. Reviewed expectations and he will update me if any concerns. He was not sure if Celexa was the right medication so will try a different SSRI.

## 2023-04-21 NOTE — PROGRESS NOTES
Wali Manuel is a 48y.o. year old male who is a new patient to me today (04/21/23). He was previous followed by [facility].  His wife is a [employer]. His is a , private practice. Assessment & Plan:   1. Routine physical examination  -     HEPATITIS C AB; Future  -     PSA, DIAGNOSTIC (PROSTATE SPECIFIC AG); Future  -     LIPID PANEL; Future  -     CBC W/O DIFF; Future  -     METABOLIC PANEL, COMPREHENSIVE; Future  2. Encounter for immunization  -     TDAP, BOOSTRIX, (AGE 10 YRS+), IM  3. Colon cancer screening  -     REFERRAL TO GASTROENTEROLOGY  4. Anxiety  Assessment & Plan: This is a new problem to me but a chronic issue, life long issue, and not as well controlled off medication, I reviewed treatment options with him, I reviewed life style changes to help improve mood, following changes were made today: will start on sertraline 50mg. Reviewed expectations and he will update me if any concerns. He was not sure if Celexa was the right medication so will try a different SSRI. Orders:  -     sertraline (ZOLOFT) 50 mg tablet; Take 0.5 Tablets by mouth daily for 10 days, THEN 1 Tablet daily for 80 days. , Normal, Disp-85 Tablet, R-0  5. Recurrent major depressive disorder, in full remission [facility])  Assessment & Plan: This is a chronic issue but new to me, it is not as well controlled off medications, reviewed treatment options with him, reviewed life style changes to help improve mood, reviewed role of daily vs prn meds, following changes were made today: will start on sertraline 50mg. Reviewed expectations and he will update me if any concerns. He was not sure if Celexa was the right medication so will try a different SSRI. Orders:  -     sertraline (ZOLOFT) 50 mg tablet; Take 0.5 Tablets by mouth daily for 10 days, THEN 1 Tablet daily for 80 days. , Normal, Disp-85 Tablet, R-0    Follow-up and Dispositions    Return in about 2 months (around 6/21/2023) for Follow up on mood, can be E-Visit, Virtual, or in Office. Subjective: Temi Chino was seen today for [facility]    Health Maintenance  Immunizations:   COVID: up to date - records will be requested   Influenza: declined. Tetanus: not UTD- will do today. Shingles: not up to date - he will look into. Pneumonia: n/a. Cancer screening:     Colon: reviewed guidelines, referral placed to GI. Prostate: reviewed guidelines, order placed. The following acute and/or chronic problems were addressed today:    Mental Health Review  Patient is seen for anxiety & depression. He has been on medications for years. He has been off it for the last month. He reports feeling more anxious and irritable. He also has a temper. Any available GAD7 and PHQ9 reviewed. Reports experiences the following side effects from the treatment: none. Patient Care Team:  Dar Mabry MD as PCP - General (Internal Medicine Physician)  LEEANN Correa as PCP - [facility] Empaneled Provider       The following sections were reviewed & updated as appropriate: Problem List, Allergies, PMH, PSH, FH, and SH. Prior to Admission medications    Medication Sig Start Date End Date Taking? Authorizing Provider   citalopram (CELEXA) 20 mg tablet Take 20 mg by mouth daily. Provider, Historical           Review of Systems   Constitutional:  Negative for chills and fever. Respiratory:  Negative for cough and shortness of breath. Cardiovascular:  Negative for chest pain and palpitations. Gastrointestinal:  Negative for abdominal pain, blood in stool, constipation, diarrhea, heartburn, nausea and vomiting. Genitourinary:         He denies: nocturia, urinary hesitancy, urinary frequency, weak stream.       Denies trouble getting or maintaining an erection. Denies trouble with AM erections. Musculoskeletal:  Negative for joint pain and myalgias. Neurological:  Negative for tingling, focal weakness and headaches. Endo/Heme/Allergies:  Does not bruise/bleed easily. Psychiatric/Behavioral:  Negative for depression. The patient is not nervous/anxious and does not have insomnia. Objective:   Physical Exam  Constitutional:       General: He is not in acute distress. Appearance: He is well-developed. HENT:      Right Ear: Tympanic membrane and ear canal normal.      Left Ear: Tympanic membrane and ear canal normal.   Eyes:      Conjunctiva/sclera: Conjunctivae normal.   Cardiovascular:      Rate and Rhythm: Regular rhythm. Heart sounds: Normal heart sounds. No murmur heard. Pulmonary:      Effort: Pulmonary effort is normal.      Breath sounds: Normal breath sounds. Abdominal:      General: Bowel sounds are normal.      Palpations: Abdomen is soft. There is no hepatomegaly or splenomegaly. Tenderness: There is no abdominal tenderness. Musculoskeletal:      Right lower leg: No edema. Left lower leg: No edema. Lymphadenopathy:      Cervical: No cervical adenopathy.    Psychiatric:         Mood and Affect: Mood and affect normal.          Visit Vitals  /85   Pulse 63   Temp 98.2 °F (36.8 °C) (Temporal)   Resp 18   Ht 5' 9\" (1.753 m)   Wt 180 lb (81.6 kg)   SpO2 99%   BMI 26.58 kg/m²         Mynor Murry MD

## 2023-04-21 NOTE — PATIENT INSTRUCTIONS
Well Visit, Men 48 to 72: Care Instructions  Overview     Well visits can help you stay healthy. Your doctor has checked your overall health and may have suggested ways to take good care of yourself. Your doctor also may have recommended tests. At home, you can help prevent illness with healthy eating, regular exercise, and other steps. Follow-up care is a key part of your treatment and safety. Be sure to make and go to all appointments, and call your doctor if you are having problems. It's also a good idea to know your test results and keep a list of the medicines you take. How can you care for yourself at home? Get screening tests that you and your doctor decide on. Screening helps find diseases before any symptoms appear. Eat healthy foods. Choose fruits, vegetables, whole grains, protein, and low-fat dairy foods. Limit fat, especially saturated fat. Reduce salt in your diet. Limit alcohol. Have no more than 2 drinks a day or 14 drinks a week. Get at least 30 minutes of exercise on most days of the week. Walking is a good choice. You also may want to do other activities, such as running, swimming, cycling, or playing tennis or team sports. Reach and stay at a healthy weight. This will lower your risk for many problems, such as obesity, diabetes, heart disease, and high blood pressure. Do not smoke. Smoking can make health problems worse. If you need help quitting, talk to your doctor about stop-smoking programs and medicines. These can increase your chances of quitting for good. Care for your mental health. It is easy to get weighed down by worry and stress. Learn strategies to manage stress, like deep breathing and mindfulness, and stay connected with your family and community. If you find you often feel sad or hopeless, talk with your doctor. Treatment can help. Talk to your doctor about whether you have any risk factors for sexually transmitted infections (STIs).  You can help prevent STIs if you wait to have sex with a new partner (or partners) until you've each been tested for STIs. It also helps if you use condoms (male or female condoms) and if you limit your sex partners to one person who only has sex with you. Vaccines are available for some STIs. If it's important to you to prevent pregnancy with your partner, talk with your doctor about birth control options that might be best for you. If you think you may have a problem with alcohol or drug use, talk to your doctor. This includes prescription medicines (such as amphetamines and opioids) and illegal drugs (such as cocaine and methamphetamine). Your doctor can help you figure out what type of treatment is best for you. Protect your skin from too much sun. When you're outdoors from 10 a.m. to 4 p.m., stay in the shade or cover up with clothing and a hat with a wide brim. Wear sunglasses that block UV rays. Even when it's cloudy, put broad-spectrum sunscreen (SPF 30 or higher) on any exposed skin. See a dentist one or two times a year for checkups and to have your teeth cleaned. Wear a seat belt in the car. When should you call for help? Watch closely for changes in your health, and be sure to contact your doctor if you have any problems or symptoms that concern you. Where can you learn more? Go to http://www.gray.com/  Enter N740 in the search box to learn more about \"Well Visit, Men 48 to 72: Care Instructions. \"  Current as of: June 6, 2022               Content Version: 13.6  © 2006-2023 [employer]. Care instructions adapted under license by [employer] (which disclaims liability or warranty for this information). If you have questions about a medical condition or this instruction, always ask your healthcare professional. [place] [address] any warranty or liability for your use of this information.

## 2023-04-22 LAB
ALBUMIN SERPL-MCNC: 4.2 G/DL (ref 3.5–5)
ALBUMIN/GLOB SERPL: 1.4 (ref 1.1–2.2)
ALP SERPL-CCNC: 82 U/L (ref 45–117)
ALT SERPL-CCNC: 36 U/L (ref 12–78)
ANION GAP SERPL CALC-SCNC: 2 MMOL/L (ref 5–15)
AST SERPL-CCNC: 13 U/L (ref 15–37)
BILIRUB SERPL-MCNC: 0.5 MG/DL (ref 0.2–1)
BUN SERPL-MCNC: 22 MG/DL (ref 6–20)
BUN/CREAT SERPL: 27 (ref 12–20)
CALCIUM SERPL-MCNC: 9.7 MG/DL (ref 8.5–10.1)
CHLORIDE SERPL-SCNC: 103 MMOL/L (ref 97–108)
CHOLEST SERPL-MCNC: 197 MG/DL
CO2 SERPL-SCNC: 32 MMOL/L (ref 21–32)
CREAT SERPL-MCNC: 0.83 MG/DL (ref 0.7–1.3)
ERYTHROCYTE [DISTWIDTH] IN BLOOD BY AUTOMATED COUNT: 12.2 % (ref 11.5–14.5)
GLOBULIN SER CALC-MCNC: 2.9 G/DL (ref 2–4)
GLUCOSE SERPL-MCNC: 97 MG/DL (ref 65–100)
HCT VFR BLD AUTO: 47.7 % (ref 36.6–50.3)
HCV AB SERPL QL IA: NONREACTIVE
HDLC SERPL-MCNC: 75 MG/DL
HDLC SERPL: 2.6 (ref 0–5)
HGB BLD-MCNC: 15.3 G/DL (ref 12.1–17)
LDLC SERPL CALC-MCNC: 107.6 MG/DL (ref 0–100)
MCH RBC QN AUTO: 30.5 PG (ref 26–34)
MCHC RBC AUTO-ENTMCNC: 32.1 G/DL (ref 30–36.5)
MCV RBC AUTO: 95 FL (ref 80–99)
NRBC # BLD: 0 K/UL (ref 0–0.01)
NRBC BLD-RTO: 0 PER 100 WBC
PLATELET # BLD AUTO: 327 K/UL (ref 150–400)
PMV BLD AUTO: 10.3 FL (ref 8.9–12.9)
POTASSIUM SERPL-SCNC: 4.3 MMOL/L (ref 3.5–5.1)
PROT SERPL-MCNC: 7.1 G/DL (ref 6.4–8.2)
PSA SERPL-MCNC: 0.4 NG/ML (ref 0.01–4)
RBC # BLD AUTO: 5.02 M/UL (ref 4.1–5.7)
SODIUM SERPL-SCNC: 137 MMOL/L (ref 136–145)
TRIGL SERPL-MCNC: 72 MG/DL (ref ?–150)
VLDLC SERPL CALC-MCNC: 14.4 MG/DL
WBC # BLD AUTO: 8.2 K/UL (ref 4.1–11.1)

## 2023-04-24 NOTE — PROGRESS NOTES
Results released to patient via [employer]. All labs are stable or at goal for him. Could not calculate 10 yr ASCVD risk.

## 2023-05-23 DIAGNOSIS — F41.9 ANXIETY: Primary | ICD-10-CM

## 2024-07-05 ENCOUNTER — TELEPHONE (OUTPATIENT)
Age: 54
End: 2024-07-05

## 2024-07-08 SDOH — ECONOMIC STABILITY: HOUSING INSECURITY
IN THE LAST 12 MONTHS, WAS THERE A TIME WHEN YOU DID NOT HAVE A STEADY PLACE TO SLEEP OR SLEPT IN A SHELTER (INCLUDING NOW)?: NO

## 2024-07-08 SDOH — ECONOMIC STABILITY: FOOD INSECURITY: WITHIN THE PAST 12 MONTHS, YOU WORRIED THAT YOUR FOOD WOULD RUN OUT BEFORE YOU GOT MONEY TO BUY MORE.: NEVER TRUE

## 2024-07-08 SDOH — ECONOMIC STABILITY: FOOD INSECURITY: WITHIN THE PAST 12 MONTHS, THE FOOD YOU BOUGHT JUST DIDN'T LAST AND YOU DIDN'T HAVE MONEY TO GET MORE.: NEVER TRUE

## 2024-07-08 SDOH — ECONOMIC STABILITY: TRANSPORTATION INSECURITY
IN THE PAST 12 MONTHS, HAS LACK OF TRANSPORTATION KEPT YOU FROM MEETINGS, WORK, OR FROM GETTING THINGS NEEDED FOR DAILY LIVING?: NO

## 2024-07-08 SDOH — ECONOMIC STABILITY: INCOME INSECURITY: HOW HARD IS IT FOR YOU TO PAY FOR THE VERY BASICS LIKE FOOD, HOUSING, MEDICAL CARE, AND HEATING?: NOT HARD AT ALL

## 2024-07-09 ENCOUNTER — OFFICE VISIT (OUTPATIENT)
Age: 54
End: 2024-07-09
Payer: COMMERCIAL

## 2024-07-09 VITALS
OXYGEN SATURATION: 97 % | BODY MASS INDEX: 27.85 KG/M2 | WEIGHT: 188 LBS | TEMPERATURE: 97.5 F | DIASTOLIC BLOOD PRESSURE: 88 MMHG | RESPIRATION RATE: 16 BRPM | HEART RATE: 74 BPM | SYSTOLIC BLOOD PRESSURE: 134 MMHG | HEIGHT: 69 IN

## 2024-07-09 DIAGNOSIS — Z12.11 COLON CANCER SCREENING: ICD-10-CM

## 2024-07-09 DIAGNOSIS — Z12.5 PROSTATE CANCER SCREENING: ICD-10-CM

## 2024-07-09 DIAGNOSIS — F33.42 RECURRENT MAJOR DEPRESSIVE DISORDER, IN FULL REMISSION (HCC): ICD-10-CM

## 2024-07-09 DIAGNOSIS — F41.9 ANXIETY: Primary | ICD-10-CM

## 2024-07-09 LAB — PSA SERPL-MCNC: 0.4 NG/ML (ref 0.01–4)

## 2024-07-09 PROCEDURE — 99213 OFFICE O/P EST LOW 20 MIN: CPT | Performed by: INTERNAL MEDICINE

## 2024-07-09 ASSESSMENT — ENCOUNTER SYMPTOMS
CONSTIPATION: 0
DIARRHEA: 0
ABDOMINAL PAIN: 0
VOMITING: 0
NAUSEA: 0
SHORTNESS OF BREATH: 0
COUGH: 0

## 2024-07-09 ASSESSMENT — PATIENT HEALTH QUESTIONNAIRE - PHQ9
8. MOVING OR SPEAKING SO SLOWLY THAT OTHER PEOPLE COULD HAVE NOTICED. OR THE OPPOSITE, BEING SO FIGETY OR RESTLESS THAT YOU HAVE BEEN MOVING AROUND A LOT MORE THAN USUAL: SEVERAL DAYS
4. FEELING TIRED OR HAVING LITTLE ENERGY: SEVERAL DAYS
9. THOUGHTS THAT YOU WOULD BE BETTER OFF DEAD, OR OF HURTING YOURSELF: NOT AT ALL
SUM OF ALL RESPONSES TO PHQ QUESTIONS 1-9: 5
SUM OF ALL RESPONSES TO PHQ QUESTIONS 1-9: 5
10. IF YOU CHECKED OFF ANY PROBLEMS, HOW DIFFICULT HAVE THESE PROBLEMS MADE IT FOR YOU TO DO YOUR WORK, TAKE CARE OF THINGS AT HOME, OR GET ALONG WITH OTHER PEOPLE: SOMEWHAT DIFFICULT
5. POOR APPETITE OR OVEREATING: NOT AT ALL
1. LITTLE INTEREST OR PLEASURE IN DOING THINGS: SEVERAL DAYS
7. TROUBLE CONCENTRATING ON THINGS, SUCH AS READING THE NEWSPAPER OR WATCHING TELEVISION: SEVERAL DAYS
3. TROUBLE FALLING OR STAYING ASLEEP: NOT AT ALL
6. FEELING BAD ABOUT YOURSELF - OR THAT YOU ARE A FAILURE OR HAVE LET YOURSELF OR YOUR FAMILY DOWN: NOT AT ALL
2. FEELING DOWN, DEPRESSED OR HOPELESS: SEVERAL DAYS
SUM OF ALL RESPONSES TO PHQ QUESTIONS 1-9: 5
SUM OF ALL RESPONSES TO PHQ QUESTIONS 1-9: 5
SUM OF ALL RESPONSES TO PHQ9 QUESTIONS 1 & 2: 2

## 2024-07-09 ASSESSMENT — ANXIETY QUESTIONNAIRES
2. NOT BEING ABLE TO STOP OR CONTROL WORRYING: MORE THAN HALF THE DAYS
GAD7 TOTAL SCORE: 10
7. FEELING AFRAID AS IF SOMETHING AWFUL MIGHT HAPPEN: NOT AT ALL
4. TROUBLE RELAXING: MORE THAN HALF THE DAYS
5. BEING SO RESTLESS THAT IT IS HARD TO SIT STILL: NOT AT ALL
1. FEELING NERVOUS, ANXIOUS, OR ON EDGE: MORE THAN HALF THE DAYS
3. WORRYING TOO MUCH ABOUT DIFFERENT THINGS: MORE THAN HALF THE DAYS
6. BECOMING EASILY ANNOYED OR IRRITABLE: MORE THAN HALF THE DAYS
IF YOU CHECKED OFF ANY PROBLEMS ON THIS QUESTIONNAIRE, HOW DIFFICULT HAVE THESE PROBLEMS MADE IT FOR YOU TO DO YOUR WORK, TAKE CARE OF THINGS AT HOME, OR GET ALONG WITH OTHER PEOPLE: SOMEWHAT DIFFICULT

## 2024-07-09 ASSESSMENT — LIFESTYLE VARIABLES
HOW OFTEN DO YOU HAVE A DRINK CONTAINING ALCOHOL: 4 OR MORE TIMES A WEEK
HOW MANY STANDARD DRINKS CONTAINING ALCOHOL DO YOU HAVE ON A TYPICAL DAY: 1 OR 2

## 2024-07-09 NOTE — ASSESSMENT & PLAN NOTE
well controlled, he reports only an issue when anxiety gets really bad.  No indication for medication.  Reviewed expectations with SSRI and what to monitor for.

## 2024-07-09 NOTE — ASSESSMENT & PLAN NOTE
Chronic issue that is not well controlled and likely due to med non-compliance. I reviewed treatment options with him, continue to follow up with specialist, I reviewed life style changes to help improve mood, following changes were made today: will increase medication from 50mg to 75mg.  We reviewed expectations and if not working will need to consider a different medication (buspar, different SSRI, or SNRI).

## 2024-07-09 NOTE — PROGRESS NOTES
Gume Delacruz III is a 54 y.o. male who was seen in clinic today (7/9/2024).    Assessment & Plan:   Below is the assessment and plan developed based on review of pertinent history, physical exam, labs, studies, and medications.    1. Anxiety  Assessment & Plan:  Chronic issue that is not well controlled and likely due to med non-compliance. I reviewed treatment options with him, continue to follow up with specialist, I reviewed life style changes to help improve mood, following changes were made today: will increase medication from 50mg to 75mg.  We reviewed expectations and if not working will need to consider a different medication (buspar, different SSRI, or SNRI).    Orders:  -     sertraline (ZOLOFT) 50 MG tablet; Take 1.5 tablets by mouth daily, Disp-135 tablet, R-1Normal  2. Recurrent major depressive disorder, in full remission (HCC)  Assessment & Plan:  well controlled, he reports only an issue when anxiety gets really bad.  No indication for medication.  Reviewed expectations with SSRI and what to monitor for.   3. Colon cancer screening  -     [facility] - Cecilia Jaimes MD, Gastroenterology, [place] ([facility])  4. Prostate cancer screening  -     PSA, Diagnostic; Future     Return in about 2 months (around 9/9/2024) for FULL PHYSICAL and regular follow up.   Subjective/Objective:   Gume was seen today for Follow-up     Since last visit: weight has increased.     Mental Health Review  Patient is seen for anxiety & depression.  He was a new patient to me on 4/21/23.  We started him on sertraline at that time.  He reports mood was better.  90 days sent into pharmacy and was instructed to follow up.  He was lost to follow up until today.  Medications ran out in the fall of '23.  He reports after stopping the medication he noticed he had a shorter fuse and was more anxiety.    He has been back on the medication for the last 3 weeks.    Stress level at work at high.  At times he will feel cold feeling

## 2024-07-10 NOTE — RESULT ENCOUNTER NOTE
Results released to patient via [facility].  All labs are stable or at goal for him. 
DISPLAY PLAN FREE TEXT
[id number]

## 2024-09-18 ENCOUNTER — TELEPHONE (OUTPATIENT)
Age: 54
End: 2024-09-18

## 2024-09-18 ENCOUNTER — OFFICE VISIT (OUTPATIENT)
Age: 54
End: 2024-09-18
Payer: COMMERCIAL

## 2024-09-18 VITALS
WEIGHT: 190 LBS | HEART RATE: 57 BPM | HEIGHT: 69 IN | OXYGEN SATURATION: 99 % | SYSTOLIC BLOOD PRESSURE: 124 MMHG | RESPIRATION RATE: 16 BRPM | TEMPERATURE: 97 F | DIASTOLIC BLOOD PRESSURE: 74 MMHG | BODY MASS INDEX: 28.14 KG/M2

## 2024-09-18 DIAGNOSIS — F41.9 ANXIETY: ICD-10-CM

## 2024-09-18 PROCEDURE — 99213 OFFICE O/P EST LOW 20 MIN: CPT | Performed by: NURSE PRACTITIONER

## 2024-09-18 ASSESSMENT — PATIENT HEALTH QUESTIONNAIRE - PHQ9: DEPRESSION UNABLE TO ASSESS: PT REFUSES

## 2024-10-01 ENCOUNTER — OFFICE VISIT (OUTPATIENT)
Age: 54
End: 2024-10-01
Payer: COMMERCIAL

## 2024-10-01 VITALS
TEMPERATURE: 97.5 F | HEIGHT: 69 IN | DIASTOLIC BLOOD PRESSURE: 84 MMHG | SYSTOLIC BLOOD PRESSURE: 123 MMHG | BODY MASS INDEX: 27.37 KG/M2 | HEART RATE: 70 BPM | RESPIRATION RATE: 16 BRPM | WEIGHT: 184.8 LBS | OXYGEN SATURATION: 98 %

## 2024-10-01 DIAGNOSIS — B36.0 PITYRIASIS VERSICOLOR: ICD-10-CM

## 2024-10-01 DIAGNOSIS — F41.9 ANXIETY: ICD-10-CM

## 2024-10-01 DIAGNOSIS — F33.42 RECURRENT MAJOR DEPRESSIVE DISORDER, IN FULL REMISSION (HCC): ICD-10-CM

## 2024-10-01 DIAGNOSIS — Z00.00 ROUTINE PHYSICAL EXAMINATION: Primary | ICD-10-CM

## 2024-10-01 DIAGNOSIS — Z71.89 ADVANCED CARE PLANNING/COUNSELING DISCUSSION: ICD-10-CM

## 2024-10-01 DIAGNOSIS — Z00.00 ROUTINE PHYSICAL EXAMINATION: ICD-10-CM

## 2024-10-01 DIAGNOSIS — Z23 ENCOUNTER FOR IMMUNIZATION: ICD-10-CM

## 2024-10-01 LAB
ALBUMIN SERPL-MCNC: 4.1 G/DL (ref 3.5–5)
ALBUMIN/GLOB SERPL: 1.5 (ref 1.1–2.2)
ALP SERPL-CCNC: 81 U/L (ref 45–117)
ALT SERPL-CCNC: 29 U/L (ref 12–78)
ANION GAP SERPL CALC-SCNC: 2 MMOL/L (ref 2–12)
AST SERPL-CCNC: 11 U/L (ref 15–37)
BILIRUB SERPL-MCNC: 0.7 MG/DL (ref 0.2–1)
BUN SERPL-MCNC: 20 MG/DL (ref 6–20)
BUN/CREAT SERPL: 22 (ref 12–20)
CALCIUM SERPL-MCNC: 10.2 MG/DL (ref 8.5–10.1)
CHLORIDE SERPL-SCNC: 103 MMOL/L (ref 97–108)
CHOLEST SERPL-MCNC: 212 MG/DL
CO2 SERPL-SCNC: 32 MMOL/L (ref 21–32)
CREAT SERPL-MCNC: 0.9 MG/DL (ref 0.7–1.3)
ERYTHROCYTE [DISTWIDTH] IN BLOOD BY AUTOMATED COUNT: 11.9 % (ref 11.5–14.5)
GLOBULIN SER CALC-MCNC: 2.8 G/DL (ref 2–4)
GLUCOSE SERPL-MCNC: 109 MG/DL (ref 65–100)
HCT VFR BLD AUTO: 46.7 % (ref 36.6–50.3)
HDLC SERPL-MCNC: 67 MG/DL
HDLC SERPL: 3.2 (ref 0–5)
HGB BLD-MCNC: 15.5 G/DL (ref 12.1–17)
LDLC SERPL CALC-MCNC: 121.2 MG/DL (ref 0–100)
MCH RBC QN AUTO: 30.3 PG (ref 26–34)
MCHC RBC AUTO-ENTMCNC: 33.2 G/DL (ref 30–36.5)
MCV RBC AUTO: 91.4 FL (ref 80–99)
NRBC # BLD: 0 K/UL (ref 0–0.01)
NRBC BLD-RTO: 0 PER 100 WBC
PLATELET # BLD AUTO: 327 K/UL (ref 150–400)
PMV BLD AUTO: 10 FL (ref 8.9–12.9)
POTASSIUM SERPL-SCNC: 4.8 MMOL/L (ref 3.5–5.1)
PROT SERPL-MCNC: 6.9 G/DL (ref 6.4–8.2)
RBC # BLD AUTO: 5.11 M/UL (ref 4.1–5.7)
SODIUM SERPL-SCNC: 137 MMOL/L (ref 136–145)
TRIGL SERPL-MCNC: 119 MG/DL
VLDLC SERPL CALC-MCNC: 23.8 MG/DL
WBC # BLD AUTO: 5.6 K/UL (ref 4.1–11.1)

## 2024-10-01 PROCEDURE — 99396 PREV VISIT EST AGE 40-64: CPT | Performed by: INTERNAL MEDICINE

## 2024-10-01 PROCEDURE — 93000 ELECTROCARDIOGRAM COMPLETE: CPT | Performed by: INTERNAL MEDICINE

## 2024-10-01 RX ORDER — FLUCONAZOLE 100 MG/1
TABLET ORAL
Qty: 10 TABLET | Refills: 0 | Status: SHIPPED | OUTPATIENT
Start: 2024-10-01

## 2024-10-01 RX ORDER — ZOSTER VACCINE RECOMBINANT, ADJUVANTED 50 MCG/0.5
0.5 KIT INTRAMUSCULAR ONCE
Qty: 0.5 ML | Refills: 1 | Status: SHIPPED | OUTPATIENT
Start: 2024-10-01 | End: 2024-10-01

## 2024-10-01 ASSESSMENT — ANXIETY QUESTIONNAIRES
4. TROUBLE RELAXING: NOT AT ALL
2. NOT BEING ABLE TO STOP OR CONTROL WORRYING: SEVERAL DAYS
5. BEING SO RESTLESS THAT IT IS HARD TO SIT STILL: NOT AT ALL
6. BECOMING EASILY ANNOYED OR IRRITABLE: NOT AT ALL
2. NOT BEING ABLE TO STOP OR CONTROL WORRYING: SEVERAL DAYS
IF YOU CHECKED OFF ANY PROBLEMS ON THIS QUESTIONNAIRE, HOW DIFFICULT HAVE THESE PROBLEMS MADE IT FOR YOU TO DO YOUR WORK, TAKE CARE OF THINGS AT HOME, OR GET ALONG WITH OTHER PEOPLE: NOT DIFFICULT AT ALL
IF YOU CHECKED OFF ANY PROBLEMS ON THIS QUESTIONNAIRE, HOW DIFFICULT HAVE THESE PROBLEMS MADE IT FOR YOU TO DO YOUR WORK, TAKE CARE OF THINGS AT HOME, OR GET ALONG WITH OTHER PEOPLE: NOT DIFFICULT AT ALL
1. FEELING NERVOUS, ANXIOUS, OR ON EDGE: SEVERAL DAYS
4. TROUBLE RELAXING: NOT AT ALL
5. BEING SO RESTLESS THAT IT IS HARD TO SIT STILL: NOT AT ALL
6. BECOMING EASILY ANNOYED OR IRRITABLE: SEVERAL DAYS
3. WORRYING TOO MUCH ABOUT DIFFERENT THINGS: NOT AT ALL
3. WORRYING TOO MUCH ABOUT DIFFERENT THINGS: SEVERAL DAYS
7. FEELING AFRAID AS IF SOMETHING AWFUL MIGHT HAPPEN: NOT AT ALL
GAD7 TOTAL SCORE: 3
7. FEELING AFRAID AS IF SOMETHING AWFUL MIGHT HAPPEN: NOT AT ALL
1. FEELING NERVOUS, ANXIOUS, OR ON EDGE: SEVERAL DAYS
GAD7 TOTAL SCORE: 3

## 2024-10-01 ASSESSMENT — PATIENT HEALTH QUESTIONNAIRE - PHQ9
SUM OF ALL RESPONSES TO PHQ QUESTIONS 1-9: 0
SUM OF ALL RESPONSES TO PHQ QUESTIONS 1-9: 0
2. FEELING DOWN, DEPRESSED OR HOPELESS: NOT AT ALL
SUM OF ALL RESPONSES TO PHQ9 QUESTIONS 1 & 2: 0
4. FEELING TIRED OR HAVING LITTLE ENERGY: NOT AT ALL
SUM OF ALL RESPONSES TO PHQ QUESTIONS 1-9: 0
5. POOR APPETITE OR OVEREATING: NOT AT ALL
8. MOVING OR SPEAKING SO SLOWLY THAT OTHER PEOPLE COULD HAVE NOTICED. OR THE OPPOSITE, BEING SO FIGETY OR RESTLESS THAT YOU HAVE BEEN MOVING AROUND A LOT MORE THAN USUAL: NOT AT ALL
6. FEELING BAD ABOUT YOURSELF - OR THAT YOU ARE A FAILURE OR HAVE LET YOURSELF OR YOUR FAMILY DOWN: NOT AT ALL
1. LITTLE INTEREST OR PLEASURE IN DOING THINGS: NOT AT ALL
9. THOUGHTS THAT YOU WOULD BE BETTER OFF DEAD, OR OF HURTING YOURSELF: NOT AT ALL
1. LITTLE INTEREST OR PLEASURE IN DOING THINGS: NOT AT ALL
SUM OF ALL RESPONSES TO PHQ QUESTIONS 1-9: 0
2. FEELING DOWN, DEPRESSED OR HOPELESS: NOT AT ALL
3. TROUBLE FALLING OR STAYING ASLEEP: NOT AT ALL
SUM OF ALL RESPONSES TO PHQ QUESTIONS 1-9: 0
7. TROUBLE CONCENTRATING ON THINGS, SUCH AS READING THE NEWSPAPER OR WATCHING TELEVISION: NOT AT ALL
SUM OF ALL RESPONSES TO PHQ9 QUESTIONS 1 & 2: 0
SUM OF ALL RESPONSES TO PHQ QUESTIONS 1-9: 0

## 2024-10-01 ASSESSMENT — ENCOUNTER SYMPTOMS
CONSTIPATION: 0
COUGH: 0
DIARRHEA: 0
SHORTNESS OF BREATH: 0
NAUSEA: 0
VOMITING: 0
ABDOMINAL PAIN: 0
BLOOD IN STOOL: 0

## 2024-10-01 NOTE — ASSESSMENT & PLAN NOTE
stable and well controlled, improved from last visit. I reviewed treatment options with him, I reviewed life style changes to help improve mood, continue current treatment.  He will let me know when he needs refills if he wants to stick with 50mg (1/2 tab) or reduce to 25mg (1 tab).

## 2024-10-01 NOTE — ASSESSMENT & PLAN NOTE
Chronic issue, new to me, he is symptomatic, he reports topical medications do not help (only improve but never resolve).  He has been on oral medication in the past w/ success. Will provide meds to use as needed.

## 2024-10-01 NOTE — PROGRESS NOTES
Gume Delacruz III is a 54 y.o. male who was seen in clinic today (10/1/2024) for a full physical.        Assessment & Plan:   Below is the assessment and plan developed based on review of pertinent history, physical exam, labs, studies, and medications.    1. Routine physical examination  -     EKG 12 Lead  -     Comprehensive Metabolic Panel; Future  -     CBC; Future  -     Lipid Panel; Future  2. Advanced care planning/counseling discussion  -     FULL CODE  3. Encounter for immunization  -     zoster recombinant adjuvanted vaccine (SHINGRIX) 50 MCG/0.5ML SUSR injection; Inject 0.5 mLs into the muscle once for 1 dose, Disp-0.5 mL, R-1Print  4. Anxiety  Assessment & Plan:  stable and well controlled, improved from last visit. I reviewed treatment options with him, I reviewed life style changes to help improve mood, continue current treatment.  He will let me know when he needs refills if he wants to stick with 50mg (1/2 tab) or reduce to 25mg (1 tab).  5. Recurrent major depressive disorder, in full remission (HCC)  Assessment & Plan:  Well controlled, medications for anxiety, not depression.  No changes   6. Pityriasis versicolor  Assessment & Plan:  Chronic issue, new to me, he is symptomatic, he reports topical medications do not help (only improve but never resolve).  He has been on oral medication in the past w/ success. Will provide meds to use as needed.   Orders:  -     fluconazole (DIFLUCAN) 100 MG tablet; 1 tab PO weekly x 2 weeks as needed for pityriasis versicolor.  Can repeat if needed, Disp-10 tablet, R-0Normal     Return in 1 year (on 10/1/2025) for CPE (Physical Exam).   Subjective:   Gume is here today for a full physical.    Since last visit: weight is stable    See ACP Note for Advanced Care Directive Discussion    Health Maintenance  Immunizations:   Covid: declined  Influenza: declined   RSV: n/a  Tetanus: up to date    Shingles: script provided  Pneumonia: n/a  Cancer screening:   Colon:

## 2024-10-01 NOTE — ACP (ADVANCE CARE PLANNING)
Advance Care Planning   Advance Care Planning (ACP) Physician/NP/PA (Provider) Conversation    Date of ACP Conversation: 10/01/24  Persons included in Conversation:  patient  Length of ACP Conversation in minutes: <16 minutes (Non-Billable)    We discussed the patient’s choices for care and treatment preferences in case of a health event that adversely affects decision-making abilities or is life-limiting. We discusses the differences between FULL CODE and DNR and when to consider a change in code status.  The limitations with CPR were reviewed.    Has ACP document(s) NOT on file - requested patient to provide.  He elects Full Code (Attempt Resuscitation)    The patient has appointed the following active healthcare agents:    Primary Decision Maker: Blanca Delacruz - Spouse - [phone]     Gumaro Day MD

## 2024-10-02 NOTE — RESULT ENCOUNTER NOTE
Results released to patient via [employer].  All labs are stable or at goal for him, except for elevated FBS (1st time, will monitor, diet changes).  Calcium corrects.  The 10-year ASCVD risk score (Claudia DAVISON, et al., 2019) is: 3.9%

## 2025-07-05 ENCOUNTER — APPOINTMENT (OUTPATIENT)
Facility: HOSPITAL | Age: 55
DRG: 603 | End: 2025-07-05
Payer: COMMERCIAL

## 2025-07-05 ENCOUNTER — HOSPITAL ENCOUNTER (INPATIENT)
Facility: HOSPITAL | Age: 55
LOS: 1 days | Discharge: HOME | DRG: 603 | End: 2025-07-06
Attending: EMERGENCY MEDICINE | Admitting: FAMILY MEDICINE
Payer: COMMERCIAL

## 2025-07-05 DIAGNOSIS — L03.115 CELLULITIS OF RIGHT LOWER EXTREMITY: Primary | ICD-10-CM

## 2025-07-05 PROBLEM — L03.90 CELLULITIS: Status: ACTIVE | Noted: 2025-07-05

## 2025-07-05 LAB
ALBUMIN SERPL-MCNC: 4 G/DL (ref 3.5–5)
ALBUMIN/GLOB SERPL: 1.3 (ref 1.1–2.2)
ALP SERPL-CCNC: 68 U/L (ref 45–117)
ALT SERPL-CCNC: 30 U/L (ref 12–78)
ANION GAP SERPL CALC-SCNC: 8 MMOL/L (ref 2–12)
AST SERPL-CCNC: 17 U/L (ref 15–37)
BASOPHILS # BLD: 0.08 K/UL (ref 0–0.1)
BASOPHILS NFR BLD: 0.8 % (ref 0–1)
BILIRUB SERPL-MCNC: 0.8 MG/DL (ref 0.2–1)
BUN SERPL-MCNC: 24 MG/DL (ref 6–20)
BUN/CREAT SERPL: 26 (ref 12–20)
CALCIUM SERPL-MCNC: 9 MG/DL (ref 8.5–10.1)
CHLORIDE SERPL-SCNC: 101 MMOL/L (ref 97–108)
CO2 SERPL-SCNC: 29 MMOL/L (ref 21–32)
CREAT SERPL-MCNC: 0.92 MG/DL (ref 0.7–1.3)
CRP SERPL-MCNC: 0.54 MG/DL (ref 0–0.3)
DIFFERENTIAL METHOD BLD: ABNORMAL
EOSINOPHIL # BLD: 0.6 K/UL (ref 0–0.4)
EOSINOPHIL NFR BLD: 6 % (ref 0–7)
ERYTHROCYTE [DISTWIDTH] IN BLOOD BY AUTOMATED COUNT: 11.9 % (ref 11.5–14.5)
ERYTHROCYTE [SEDIMENTATION RATE] IN BLOOD: 3 MM/HR (ref 0–20)
GLOBULIN SER CALC-MCNC: 3.1 G/DL (ref 2–4)
GLUCOSE SERPL-MCNC: 92 MG/DL (ref 65–100)
HCT VFR BLD AUTO: 45.5 % (ref 36.6–50.3)
HGB BLD-MCNC: 15.4 G/DL (ref 12.1–17)
IMM GRANULOCYTES # BLD AUTO: 0.03 K/UL (ref 0–0.04)
IMM GRANULOCYTES NFR BLD AUTO: 0.3 % (ref 0–0.5)
LACTATE SERPL-SCNC: 0.6 MMOL/L (ref 0.4–2)
LYMPHOCYTES # BLD: 2.54 K/UL (ref 0.8–3.5)
LYMPHOCYTES NFR BLD: 25.2 % (ref 12–49)
MCH RBC QN AUTO: 30.1 PG (ref 26–34)
MCHC RBC AUTO-ENTMCNC: 33.8 G/DL (ref 30–36.5)
MCV RBC AUTO: 89 FL (ref 80–99)
MONOCYTES # BLD: 0.79 K/UL (ref 0–1)
MONOCYTES NFR BLD: 7.9 % (ref 5–13)
NEUTS SEG # BLD: 6.02 K/UL (ref 1.8–8)
NEUTS SEG NFR BLD: 59.8 % (ref 32–75)
NRBC # BLD: 0 K/UL (ref 0–0.01)
NRBC BLD-RTO: 0 PER 100 WBC
PLATELET # BLD AUTO: 261 K/UL (ref 150–400)
PMV BLD AUTO: 9.6 FL (ref 8.9–12.9)
POTASSIUM SERPL-SCNC: 4.5 MMOL/L (ref 3.5–5.1)
PROT SERPL-MCNC: 7.1 G/DL (ref 6.4–8.2)
RBC # BLD AUTO: 5.11 M/UL (ref 4.1–5.7)
SODIUM SERPL-SCNC: 138 MMOL/L (ref 136–145)
WBC # BLD AUTO: 10.1 K/UL (ref 4.1–11.1)

## 2025-07-05 PROCEDURE — 6370000000 HC RX 637 (ALT 250 FOR IP): Performed by: NURSE PRACTITIONER

## 2025-07-05 PROCEDURE — 86140 C-REACTIVE PROTEIN: CPT

## 2025-07-05 PROCEDURE — 80053 COMPREHEN METABOLIC PANEL: CPT

## 2025-07-05 PROCEDURE — 83605 ASSAY OF LACTIC ACID: CPT

## 2025-07-05 PROCEDURE — 87205 SMEAR GRAM STAIN: CPT

## 2025-07-05 PROCEDURE — 85652 RBC SED RATE AUTOMATED: CPT

## 2025-07-05 PROCEDURE — 73590 X-RAY EXAM OF LOWER LEG: CPT

## 2025-07-05 PROCEDURE — 36415 COLL VENOUS BLD VENIPUNCTURE: CPT

## 2025-07-05 PROCEDURE — 85025 COMPLETE CBC W/AUTO DIFF WBC: CPT

## 2025-07-05 PROCEDURE — 6360000002 HC RX W HCPCS: Performed by: HOSPITALIST

## 2025-07-05 PROCEDURE — 2580000003 HC RX 258: Performed by: HOSPITALIST

## 2025-07-05 PROCEDURE — 94760 N-INVAS EAR/PLS OXIMETRY 1: CPT

## 2025-07-05 PROCEDURE — 1100000000 HC RM PRIVATE

## 2025-07-05 PROCEDURE — 2500000003 HC RX 250 WO HCPCS: Performed by: EMERGENCY MEDICINE

## 2025-07-05 PROCEDURE — 2580000003 HC RX 258: Performed by: NURSE PRACTITIONER

## 2025-07-05 PROCEDURE — 2580000003 HC RX 258: Performed by: EMERGENCY MEDICINE

## 2025-07-05 PROCEDURE — 6370000000 HC RX 637 (ALT 250 FOR IP): Performed by: EMERGENCY MEDICINE

## 2025-07-05 PROCEDURE — 73630 X-RAY EXAM OF FOOT: CPT

## 2025-07-05 PROCEDURE — 87070 CULTURE OTHR SPECIMN AEROBIC: CPT

## 2025-07-05 PROCEDURE — 2500000003 HC RX 250 WO HCPCS: Performed by: NURSE PRACTITIONER

## 2025-07-05 PROCEDURE — 6360000002 HC RX W HCPCS: Performed by: NURSE PRACTITIONER

## 2025-07-05 PROCEDURE — 6360000002 HC RX W HCPCS: Performed by: EMERGENCY MEDICINE

## 2025-07-05 PROCEDURE — 96374 THER/PROPH/DIAG INJ IV PUSH: CPT

## 2025-07-05 PROCEDURE — 99285 EMERGENCY DEPT VISIT HI MDM: CPT

## 2025-07-05 PROCEDURE — 96375 TX/PRO/DX INJ NEW DRUG ADDON: CPT

## 2025-07-05 RX ORDER — DIPHENHYDRAMINE HYDROCHLORIDE 50 MG/ML
25 INJECTION, SOLUTION INTRAMUSCULAR; INTRAVENOUS EVERY 8 HOURS PRN
Status: DISCONTINUED | OUTPATIENT
Start: 2025-07-05 | End: 2025-07-06 | Stop reason: HOSPADM

## 2025-07-05 RX ORDER — ONDANSETRON 4 MG/1
4 TABLET, ORALLY DISINTEGRATING ORAL EVERY 8 HOURS PRN
Status: DISCONTINUED | OUTPATIENT
Start: 2025-07-05 | End: 2025-07-06 | Stop reason: HOSPADM

## 2025-07-05 RX ORDER — ACETAMINOPHEN 650 MG/1
650 SUPPOSITORY RECTAL EVERY 6 HOURS PRN
Status: DISCONTINUED | OUTPATIENT
Start: 2025-07-05 | End: 2025-07-06 | Stop reason: HOSPADM

## 2025-07-05 RX ORDER — OXYCODONE HYDROCHLORIDE 5 MG/1
5 TABLET ORAL EVERY 4 HOURS PRN
Refills: 0 | Status: DISCONTINUED | OUTPATIENT
Start: 2025-07-05 | End: 2025-07-06 | Stop reason: HOSPADM

## 2025-07-05 RX ORDER — DIPHENHYDRAMINE HCL 25 MG
25 CAPSULE ORAL ONCE
Status: COMPLETED | OUTPATIENT
Start: 2025-07-05 | End: 2025-07-05

## 2025-07-05 RX ORDER — SODIUM CHLORIDE 9 MG/ML
INJECTION, SOLUTION INTRAVENOUS CONTINUOUS
Status: DISPENSED | OUTPATIENT
Start: 2025-07-05 | End: 2025-07-06

## 2025-07-05 RX ORDER — POLYETHYLENE GLYCOL 3350 17 G/17G
17 POWDER, FOR SOLUTION ORAL DAILY PRN
Status: DISCONTINUED | OUTPATIENT
Start: 2025-07-05 | End: 2025-07-06 | Stop reason: HOSPADM

## 2025-07-05 RX ORDER — ENOXAPARIN SODIUM 100 MG/ML
40 INJECTION SUBCUTANEOUS DAILY
Status: DISCONTINUED | OUTPATIENT
Start: 2025-07-05 | End: 2025-07-06 | Stop reason: HOSPADM

## 2025-07-05 RX ORDER — MORPHINE SULFATE 4 MG/ML
4 INJECTION, SOLUTION INTRAMUSCULAR; INTRAVENOUS
Status: DISCONTINUED | OUTPATIENT
Start: 2025-07-05 | End: 2025-07-05

## 2025-07-05 RX ORDER — SODIUM CHLORIDE 9 MG/ML
INJECTION, SOLUTION INTRAVENOUS PRN
Status: DISCONTINUED | OUTPATIENT
Start: 2025-07-05 | End: 2025-07-06 | Stop reason: HOSPADM

## 2025-07-05 RX ORDER — ONDANSETRON 2 MG/ML
4 INJECTION INTRAMUSCULAR; INTRAVENOUS EVERY 6 HOURS PRN
Status: DISCONTINUED | OUTPATIENT
Start: 2025-07-05 | End: 2025-07-06 | Stop reason: HOSPADM

## 2025-07-05 RX ORDER — KETOROLAC TROMETHAMINE 30 MG/ML
15 INJECTION, SOLUTION INTRAMUSCULAR; INTRAVENOUS
Status: COMPLETED | OUTPATIENT
Start: 2025-07-05 | End: 2025-07-05

## 2025-07-05 RX ORDER — ACETAMINOPHEN 325 MG/1
650 TABLET ORAL EVERY 6 HOURS PRN
Status: DISCONTINUED | OUTPATIENT
Start: 2025-07-05 | End: 2025-07-06 | Stop reason: HOSPADM

## 2025-07-05 RX ORDER — SODIUM CHLORIDE 0.9 % (FLUSH) 0.9 %
5-40 SYRINGE (ML) INJECTION PRN
Status: DISCONTINUED | OUTPATIENT
Start: 2025-07-05 | End: 2025-07-06 | Stop reason: HOSPADM

## 2025-07-05 RX ORDER — SODIUM CHLORIDE 0.9 % (FLUSH) 0.9 %
5-40 SYRINGE (ML) INJECTION EVERY 12 HOURS SCHEDULED
Status: DISCONTINUED | OUTPATIENT
Start: 2025-07-05 | End: 2025-07-06 | Stop reason: HOSPADM

## 2025-07-05 RX ADMIN — KETOROLAC TROMETHAMINE 15 MG: 30 INJECTION, SOLUTION INTRAMUSCULAR at 06:13

## 2025-07-05 RX ADMIN — DIPHENHYDRAMINE HYDROCHLORIDE 25 MG: 50 INJECTION INTRAMUSCULAR; INTRAVENOUS at 18:43

## 2025-07-05 RX ADMIN — VANCOMYCIN HYDROCHLORIDE 1000 MG: 1 INJECTION, POWDER, LYOPHILIZED, FOR SOLUTION INTRAVENOUS at 05:33

## 2025-07-05 RX ADMIN — ACETAMINOPHEN 650 MG: 325 TABLET ORAL at 16:51

## 2025-07-05 RX ADMIN — CEFEPIME 2000 MG: 2 INJECTION, POWDER, FOR SOLUTION INTRAVENOUS at 17:01

## 2025-07-05 RX ADMIN — SODIUM CHLORIDE, PRESERVATIVE FREE 10 ML: 5 INJECTION INTRAVENOUS at 21:19

## 2025-07-05 RX ADMIN — VANCOMYCIN HYDROCHLORIDE 1000 MG: 1 INJECTION, POWDER, LYOPHILIZED, FOR SOLUTION INTRAVENOUS at 18:21

## 2025-07-05 RX ADMIN — SODIUM CHLORIDE: 9 INJECTION, SOLUTION INTRAVENOUS at 20:02

## 2025-07-05 RX ADMIN — SODIUM CHLORIDE 1250 MG: 0.9 INJECTION, SOLUTION INTRAVENOUS at 05:27

## 2025-07-05 RX ADMIN — WATER 2000 MG: 1 INJECTION INTRAMUSCULAR; INTRAVENOUS; SUBCUTANEOUS at 05:16

## 2025-07-05 RX ADMIN — ENOXAPARIN SODIUM 40 MG: 100 INJECTION SUBCUTANEOUS at 11:11

## 2025-07-05 RX ADMIN — MORPHINE SULFATE 4 MG: 4 INJECTION INTRAVENOUS at 06:13

## 2025-07-05 RX ADMIN — DIPHENHYDRAMINE HYDROCHLORIDE 25 MG: 25 CAPSULE ORAL at 09:18

## 2025-07-05 RX ADMIN — SODIUM CHLORIDE: 9 INJECTION, SOLUTION INTRAVENOUS at 11:35

## 2025-07-05 RX ADMIN — OXYCODONE 5 MG: 5 TABLET ORAL at 16:50

## 2025-07-05 ASSESSMENT — PAIN DESCRIPTION - LOCATION
LOCATION: FOOT
LOCATION: ANKLE
LOCATION: FOOT

## 2025-07-05 ASSESSMENT — PAIN SCALES - GENERAL
PAINLEVEL_OUTOF10: 6
PAINLEVEL_OUTOF10: 1
PAINLEVEL_OUTOF10: 2
PAINLEVEL_OUTOF10: 5
PAINLEVEL_OUTOF10: 7

## 2025-07-05 ASSESSMENT — PAIN DESCRIPTION - ORIENTATION
ORIENTATION: RIGHT

## 2025-07-05 ASSESSMENT — PAIN DESCRIPTION - PAIN TYPE
TYPE: ACUTE PAIN
TYPE: ACUTE PAIN

## 2025-07-05 ASSESSMENT — PAIN DESCRIPTION - DESCRIPTORS
DESCRIPTORS: ITCHING
DESCRIPTORS: ACHING
DESCRIPTORS: ACHING

## 2025-07-05 ASSESSMENT — PAIN - FUNCTIONAL ASSESSMENT
PAIN_FUNCTIONAL_ASSESSMENT: ACTIVITIES ARE NOT PREVENTED
PAIN_FUNCTIONAL_ASSESSMENT: 0-10

## 2025-07-05 NOTE — PROGRESS NOTES
Pharmacist Note - Vancomycin Dosing    Consult provided for this 55 y.o. male for indication of SSTI.  Antibiotic regimen(s): Cefepime + Vancomycin  Patient on vancomycin PTA? NO   Pregnancy status: N/A    Recent Labs     25  0452   WBC 10.1   CREATININE 0.92   BUN 24*     Frequency of BMP: Daily through 25  Height: 174.7 cm  Weight: 87 kg  Est CrCl: ~ ml/min; UO: -- ml/kg/hr  Temp (24hrs), Av.7 °F (36.5 °C), Min:97.7 °F (36.5 °C), Max:97.7 °F (36.5 °C)    Cultures:   Wound [foot]: pending    MRSA Swab ordered (if applicable)? N/A    The plan below is expected to result in a target range of AUC/SCOTTIE 400-500    Therapy will be initiated with a loading dose of 2250 mg IV x 1 to be followed by a maintenance dose of 1000 mg IV every 12 hours.  Pharmacy to follow patient daily and order levels / make dose adjustments as appropriate.    *Vancomycin has been dosed used Bayesian kinetics software to target an AUC/SCOTTIE of 400-600, which provides adequate exposure for an assumed infection due to MRSA with an SCOTTIE of 1 or less while reducing the risk of nephrotoxicity as seen with traditional trough based dosing goals.

## 2025-07-05 NOTE — ED PROVIDER NOTES
(gastroesophageal reflux disease)     Kidney stones     Psychiatric disorder     Depression         SURGICAL HISTORY       Past Surgical History:   Procedure Laterality Date    LITHOTRIPSY Left 2020    TONSILLECTOMY      WISDOM TOOTH EXTRACTION           CURRENT MEDICATIONS       Previous Medications    FLUCONAZOLE (DIFLUCAN) 100 MG TABLET    1 tab PO weekly x 2 weeks as needed for pityriasis versicolor.  Can repeat if needed    SERTRALINE (ZOLOFT) 50 MG TABLET    Take 1 tablet by mouth daily       ALLERGIES     Patient has no known allergies.    FAMILY HISTORY       Family History   Problem Relation Age of Onset    Breast Cancer Mother     Ulcerative Colitis Father 84    Kidney stones Father     No Known Problems Sister     Diabetes Maternal Grandfather     Alcohol Abuse Paternal Grandmother     Alcohol Abuse Paternal Grandfather     Anxiety Disorder Daughter     Kidney stones Daughter     Anesth Problems Neg Hx           SOCIAL HISTORY       Social History     Socioeconomic History    Marital status:      Spouse name: None    Number of children: None    Years of education: None    Highest education level: None   Tobacco Use    Smoking status: Never    Smokeless tobacco: Former     Quit date: 10/16/2008   Substance and Sexual Activity    Alcohol use: Yes     Alcohol/week: 4.0 standard drinks of alcohol     Types: 4 Standard drinks or equivalent per week    Drug use: No     Social Drivers of Health     Financial Resource Strain: Low Risk  (7/8/2024)    Overall Financial Resource Strain (CARDIA)     Difficulty of Paying Living Expenses: Not hard at all   Food Insecurity: No Food Insecurity (7/8/2024)    Hunger Vital Sign     Worried About Running Out of Food in the Last Year: Never true     Ran Out of Food in the Last Year: Never true   Transportation Needs: Unknown (7/8/2024)    PRAPARE - Transportation     Lack of Transportation (Non-Medical): No   Housing Stability: Unknown (7/8/2024)    Housing Stability 
[facility]

## 2025-07-05 NOTE — H&P
Transportation (Non-Medical): No   Physical Activity: Not on file   Stress: Not on file   Social Connections: Not on file   Intimate Partner Violence: Not on file   Depression: Not at risk (10/1/2024)    PHQ-2     PHQ-2 Score: 0   Recent Concern: Depression - At risk (7/9/2024)    PHQ-2     PHQ-2 Score: 5   Housing Stability: Unknown (7/8/2024)    Housing Stability Vital Sign     Unable to Pay for Housing in the Last Year: Not on file     Number of Places Lived in the Last Year: Not on file     Unstable Housing in the Last Year: No   Interpersonal Safety: Not At Risk (7/5/2025)    Interpersonal Safety Domain Source: IP Abuse Screening     Physical abuse: Denies     Verbal abuse: Denies     Emotional abuse: Denies     Financial abuse: Denies     Sexual abuse: Denies   Utilities: Not on file      Medications were reconciled to the best of my ability given all available resources at the time of admission. Route is PO if not otherwise noted.     Family and social history were personally reviewed, all pertinent and relevant details are outlined as above.    Objective:   /72   Pulse 70   Temp 97.7 °F (36.5 °C) (Tympanic)   Resp 16   Wt 87 kg (191 lb 12.8 oz)   SpO2 98%   BMI 28.51 kg/m²         PHYSICAL EXAM:     General: Fairly young, well-nourished  HEENT: EOMI, moist mucus membranes  Neck: Trachea midline  Chest: Clear to auscultation bilaterally, sats 98% on room air  CVS: RRR, no murmur noted.  Heart rate 73  Abd: Soft, non-tender, non-distended, +bowel sounds.  Denies any nausea or vomiting  Ext: Moves all EXTR.  Has right lower extremity/ankle/foot edema (see below for imaging)  Neuro/Psych: Pleasant mood and affect, sensory grossly within normal limit, Strength 5/5 in all extremities, alert and oriented to person, place, time and situation  Pulses: Palpable  Skin: See below for imaging of right foot/ankle          Data Review:   I have independently reviewed and interpreted patient's lab and all other

## 2025-07-05 NOTE — ED NOTES
Bedside and Verbal shift change report given to DARIEN Locke (oncoming nurse) by DARIEN Dallas (offgoing nurse). Report included the following information Nurse Handoff Report.

## 2025-07-05 NOTE — ED TRIAGE NOTES
Pt reports right foot redness and swelling since yesterday worse this morning. Pt reports going for hiking yesterday but denies injuries. Had able to bear weight. Took benadryl yesterday.

## 2025-07-05 NOTE — ED NOTES
Bedside and Verbal shift change report given to DARIEN Dallas (oncoming nurse) by DARIEN Landon (offgoing nurse). Report included the following information Nurse Handoff Report, ED Encounter Summary, ED SBAR, Adult Overview, MAR, Recent Results, and Med Rec Status.

## 2025-07-05 NOTE — ED NOTES
Pt and family updated on plan of care and admission. Pt reports itchiness in foot. Dr. De Oliveira made aware and diphenhydramine ordered.

## 2025-07-06 VITALS
HEART RATE: 61 BPM | BODY MASS INDEX: 28.51 KG/M2 | RESPIRATION RATE: 14 BRPM | OXYGEN SATURATION: 99 % | TEMPERATURE: 97.6 F | SYSTOLIC BLOOD PRESSURE: 101 MMHG | WEIGHT: 191.8 LBS | DIASTOLIC BLOOD PRESSURE: 77 MMHG

## 2025-07-06 LAB
ANION GAP SERPL CALC-SCNC: 2 MMOL/L (ref 2–12)
BASOPHILS # BLD: 0.06 K/UL (ref 0–0.1)
BASOPHILS NFR BLD: 0.9 % (ref 0–1)
BUN SERPL-MCNC: 25 MG/DL (ref 6–20)
BUN/CREAT SERPL: 30 (ref 12–20)
CALCIUM SERPL-MCNC: 8.5 MG/DL (ref 8.5–10.1)
CHLORIDE SERPL-SCNC: 112 MMOL/L (ref 97–108)
CO2 SERPL-SCNC: 27 MMOL/L (ref 21–32)
CREAT SERPL-MCNC: 0.83 MG/DL (ref 0.7–1.3)
DIFFERENTIAL METHOD BLD: ABNORMAL
EOSINOPHIL # BLD: 0.49 K/UL (ref 0–0.4)
EOSINOPHIL NFR BLD: 7 % (ref 0–7)
ERYTHROCYTE [DISTWIDTH] IN BLOOD BY AUTOMATED COUNT: 11.9 % (ref 11.5–14.5)
GLUCOSE SERPL-MCNC: 115 MG/DL (ref 65–100)
HCT VFR BLD AUTO: 39.3 % (ref 36.6–50.3)
HGB BLD-MCNC: 13 G/DL (ref 12.1–17)
IMM GRANULOCYTES # BLD AUTO: 0.02 K/UL (ref 0–0.04)
IMM GRANULOCYTES NFR BLD AUTO: 0.3 % (ref 0–0.5)
LYMPHOCYTES # BLD: 2.15 K/UL (ref 0.8–3.5)
LYMPHOCYTES NFR BLD: 30.8 % (ref 12–49)
MCH RBC QN AUTO: 30.3 PG (ref 26–34)
MCHC RBC AUTO-ENTMCNC: 33.1 G/DL (ref 30–36.5)
MCV RBC AUTO: 91.6 FL (ref 80–99)
MONOCYTES # BLD: 0.65 K/UL (ref 0–1)
MONOCYTES NFR BLD: 9.3 % (ref 5–13)
NEUTS SEG # BLD: 3.6 K/UL (ref 1.8–8)
NEUTS SEG NFR BLD: 51.7 % (ref 32–75)
NRBC # BLD: 0 K/UL (ref 0–0.01)
NRBC BLD-RTO: 0 PER 100 WBC
PLATELET # BLD AUTO: 229 K/UL (ref 150–400)
PMV BLD AUTO: 9.6 FL (ref 8.9–12.9)
POTASSIUM SERPL-SCNC: 4.5 MMOL/L (ref 3.5–5.1)
RBC # BLD AUTO: 4.29 M/UL (ref 4.1–5.7)
SODIUM SERPL-SCNC: 141 MMOL/L (ref 136–145)
VANCOMYCIN SERPL-MCNC: 11 UG/ML
WBC # BLD AUTO: 7 K/UL (ref 4.1–11.1)

## 2025-07-06 PROCEDURE — 94760 N-INVAS EAR/PLS OXIMETRY 1: CPT

## 2025-07-06 PROCEDURE — 80202 ASSAY OF VANCOMYCIN: CPT

## 2025-07-06 PROCEDURE — 80048 BASIC METABOLIC PNL TOTAL CA: CPT

## 2025-07-06 PROCEDURE — 2580000003 HC RX 258: Performed by: HOSPITALIST

## 2025-07-06 PROCEDURE — 85025 COMPLETE CBC W/AUTO DIFF WBC: CPT

## 2025-07-06 PROCEDURE — 6370000000 HC RX 637 (ALT 250 FOR IP): Performed by: NURSE PRACTITIONER

## 2025-07-06 PROCEDURE — 6360000002 HC RX W HCPCS: Performed by: NURSE PRACTITIONER

## 2025-07-06 PROCEDURE — 6360000002 HC RX W HCPCS: Performed by: HOSPITALIST

## 2025-07-06 PROCEDURE — 2580000003 HC RX 258: Performed by: NURSE PRACTITIONER

## 2025-07-06 RX ORDER — DOXYCYCLINE HYCLATE 100 MG
100 TABLET ORAL 2 TIMES DAILY
Qty: 10 TABLET | Refills: 0 | Status: SHIPPED | OUTPATIENT
Start: 2025-07-06 | End: 2025-07-11

## 2025-07-06 RX ORDER — HYDROXYZINE HYDROCHLORIDE 25 MG/1
25 TABLET, FILM COATED ORAL EVERY 8 HOURS PRN
Qty: 30 TABLET | Refills: 0 | Status: SHIPPED | OUTPATIENT
Start: 2025-07-06 | End: 2025-07-16

## 2025-07-06 RX ADMIN — SODIUM CHLORIDE: 0.9 INJECTION, SOLUTION INTRAVENOUS at 05:24

## 2025-07-06 RX ADMIN — CEFEPIME 2000 MG: 2 INJECTION, POWDER, FOR SOLUTION INTRAVENOUS at 05:24

## 2025-07-06 RX ADMIN — VANCOMYCIN HYDROCHLORIDE 1000 MG: 1 INJECTION, POWDER, LYOPHILIZED, FOR SOLUTION INTRAVENOUS at 05:24

## 2025-07-06 RX ADMIN — ENOXAPARIN SODIUM 40 MG: 100 INJECTION SUBCUTANEOUS at 09:49

## 2025-07-06 RX ADMIN — OXYCODONE 5 MG: 5 TABLET ORAL at 02:37

## 2025-07-06 RX ADMIN — DIPHENHYDRAMINE HYDROCHLORIDE 25 MG: 50 INJECTION INTRAMUSCULAR; INTRAVENOUS at 07:23

## 2025-07-06 RX ADMIN — ACETAMINOPHEN 650 MG: 325 TABLET ORAL at 02:37

## 2025-07-06 ASSESSMENT — PAIN DESCRIPTION - ORIENTATION: ORIENTATION: RIGHT

## 2025-07-06 ASSESSMENT — PAIN SCALES - GENERAL
PAINLEVEL_OUTOF10: 1
PAINLEVEL_OUTOF10: 4
PAINLEVEL_OUTOF10: 2

## 2025-07-06 ASSESSMENT — PAIN DESCRIPTION - DESCRIPTORS: DESCRIPTORS: ACHING

## 2025-07-06 NOTE — PROGRESS NOTES
Pt was advised that we wheelchair our patients down during discharge, patient declined and states they will be okay to ambulate to discharge.

## 2025-07-06 NOTE — PROGRESS NOTES
Day #2 of Vancomycin - Level/Dosing Update  Indication:  Cellulitis of the right foot and ankle - from possible insect bite  Current regimen:  1000 mg IV Q12H  Abx regimen:  Cefepime + Vancomycin  ID Following ?: NO  Concomitant nephrotoxic drugs (requires more frequent monitoring): None  Frequency of BMP?: Daily through     Recent Labs     25  0452 25  0332   WBC 10.1 7.0   CREATININE 0.92 0.83   BUN 24* 25*     Est CrCl: >100 ml/min; UO: -- ml/kg/hr  Temp (24hrs), Av.7 °F (36.5 °C), Min:97.6 °F (36.4 °C), Max:97.7 °F (36.5 °C)    Cultures:    Wound [foot]: NGTD    MRSA Swab ordered (if applicable)? N/A    Goal target range AUC/SCOTTIE 400-500    Recent level history:  Date/Time Dose & Interval Measured Level (mcg/mL) Associated AUC/SCOTTIE Dose Adjustment     @ 0332 1000 mg IV Q12H 11 (~9 hr post-dose) 374 Change to 1250 mg IV Q12H                                         Plan: The random vancomycin level drawn this morning correlates with an AUC/SCOTTIE of 374, which is just below the goal range.  Plan will be to adjust the dose to 1250 mg IV Q12H for an estimated AUC/SCOTTIE of 453.  Pharmacy will continue to monitor patient daily and will make dosage adjustments based upon changing renal function.    *Random vancomycin levels are used to calculate AUC/SCOTTIE, this level should not be interpreted as a trough. Vancomycin has been dosed using Bayesian kinetics software to target an AUC24:SCOTTIE of 400-600, which provides adequate exposure for as assumed infection due to MRSA with an SCOTTIE of 1 or less while reducing the risk of nephrotoxicity as seen with traditional trough based dosing goals.

## 2025-07-06 NOTE — DISCHARGE SUMMARY
Discharge Summary       PATIENT ID: Gume Delacruz III  MRN: [id number]   YOB: 1970    DATE OF ADMISSION: 7/5/2025  4:07 AM    DATE OF DISCHARGE: 7/6/2025    PRIMARY CARE PROVIDER: Gumaro Day MD     ATTENDING PHYSICIAN: MARCELO Hair MD  DISCHARGING PROVIDER: YAN Jones NP    To contact this individual call [phone] and ask the  to page.  If unavailable ask to be transferred the Adult Hospitalist Department.    CONSULTATIONS: IP CONSULT TO PHARMACY    PROCEDURES/SURGERIES: * No surgery found *     ADMITTING DIAGNOSES & HOSPITAL COURSE:   Per H&P   Gume Delacruz III is a 55 y.o. male who presented to [facility] for pain and swelling of the right foot and ankle.  He and his wife indicate that he was \"bitten\" this by something with wings on Monday. Had what appeared to be a small bug bite at his ankle that was slightly red but nothing more. He then went on a hike and felt irritation on the lateral aspect of his ankle he thought from where his shoe was rubbing. Over the last 24 hours he has had progressive significant swelling and redness with blistering to the ankle/foot.  No fevers or rigors.  Had difficulty sleeping overnight due to increased pain, swelling.  + Pruritus.  Patient was transferred to [facility] for further evaluation and treatment.     Patient was seen and examined this morning, he was lying in bed in no acute distress, cooperative and interactive with the assessment.  Patient's wife (who is a physician) was present at bedside.  Patient denies any headaches or dizziness, chest pain or pressure, shortness of breath or cough.  Denies any GI complaints such as nausea, vomiting, diarrhea or constipation and has no dysuria.  Currently reports Tylenol is adequate for pain management but verbalized understanding that he may contact provider if he needs any additional pain medications.  Reviewed plan of care, patient verbalized understanding of continue vancomycin and

## 2025-07-06 NOTE — PLAN OF CARE
Problem: Discharge Planning  Goal: Discharge to home or other facility with appropriate resources  7/6/2025 1040 by Margarita Ibrahim LPN  Outcome: Progressing     Problem: Safety - Adult  Goal: Free from fall injury  7/6/2025 1040 by Margarita Ibrahim LPN  Outcome: Progressing     Problem: Pain  Goal: Verbalizes/displays adequate comfort level or baseline comfort level  7/6/2025 1040 by Margarita Ibrahim LPN  Outcome: Progressing

## 2025-07-06 NOTE — DISCHARGE INSTRUCTIONS
Discharge Instructions       PATIENT ID: Gume Delacruz III  MRN: [id number]   YOB: 1970    DATE OF ADMISSION: 7/5/2025   DATE OF DISCHARGE: 7/6/2025    PRIMARY CARE PROVIDER: Gumaro Day     ATTENDING PHYSICIAN: Ester Hair MD   DISCHARGING PROVIDER: YAN Jones NP    To contact this individual call [phone] and ask the  to page.   If unavailable ask to be transferred the Adult Hospitalist Department.    DISCHARGE DIAGNOSES cellulitis    CONSULTATIONS: IP CONSULT TO PHARMACY    PROCEDURES/SURGERIES: * No surgery found *    PENDING TEST RESULTS:   At the time of discharge the following test results are still pending:     FOLLOW UP APPOINTMENTS:   Follow-up Information         Follow up With Specialties Details Why Contact Info     Gumaro Day MD Internal Medicine Follow up in 1 week(s)   [address] [id number] [phone]        Gumaro Day MD Internal Medicine     [address] [place] [address] [id number] [phone]                      ADDITIONAL CARE RECOMMENDATIONS: Keep area dry and clean, may keep OpSite over area to protect blister     DIET: regular diet        ACTIVITY: activity as tolerated     WOUND CARE: As above      DISCHARGE MEDICATIONS:   See Medication Reconciliation Form    It is important that you take the medication exactly as they are prescribed.   Keep your medication in the bottles provided by the pharmacist and keep a list of the medication names, dosages, and times to be taken in your wallet.   Do not take other medications without consulting your doctor.       NOTIFY YOUR PHYSICIAN FOR ANY OF THE FOLLOWING:   Fever over 101 degrees for 24 hours.   Chest pain, shortness of breath, fever, chills, nausea, vomiting, diarrhea, change in mentation, falling, weakness, bleeding. Severe pain or pain not relieved by medications.  Or, any other signs or symptoms that you may have questions

## 2025-07-06 NOTE — PLAN OF CARE
Problem: Discharge Planning  Goal: Discharge to home or other facility with appropriate resources  7/5/2025 2358 by Víctor Talamantes RN  Outcome: Progressing  7/5/2025 1610 by Margarita Ibrahim LPN  Outcome: Progressing     Problem: Safety - Adult  Goal: Free from fall injury  7/5/2025 2358 by Víctor Talamantes RN  Outcome: Progressing  7/5/2025 1610 by Margarita Ibrahim LPN  Outcome: Progressing     Problem: Pain  Goal: Verbalizes/displays adequate comfort level or baseline comfort level  Outcome: Progressing

## 2025-07-07 ENCOUNTER — CARE COORDINATION (OUTPATIENT)
Dept: OTHER | Facility: CLINIC | Age: 55
End: 2025-07-07

## 2025-07-07 LAB
BACTERIA SPEC CULT: NORMAL
GRAM STN SPEC: NORMAL
GRAM STN SPEC: NORMAL
SERVICE CMNT-IMP: NORMAL

## 2025-07-07 NOTE — CARE COORDINATION
Care Transitions Note    Initial Call - Call within 2 business days of discharge: Yes    Attempted to reach patient for transitions of care follow up. Unable to reach patient.    Outreach Attempts:   HIPAA compliant voicemail left for patient.     Patient: Gume Delacruz III    Patient : 1970   MRN: [id number]    Reason for Admission: cellulitis   Discharge Date: 25  RURS: Readmission Risk Score: 4.4    Last Discharge Facility       Date Complaint Diagnosis Description Type Department Provider    25 Foot Pain Cellulitis of right lower extremity ED to Hosp-Admission (Discharged) (ADMITTED) [id number] Ester Hair MD; Dion,...            Was this an external facility discharge? No    Follow Up Appointment:   Patient does not have a follow up appointment scheduled at time of call. UTR   Future Appointments         Provider Specialty Dept Phone    10/3/2025 8:40 AM Gumaro Day MD Internal Medicine [phone]            Plan for follow-up on next business day.      Farida Oconnor RN

## 2025-07-08 ENCOUNTER — CARE COORDINATION (OUTPATIENT)
Dept: OTHER | Facility: CLINIC | Age: 55
End: 2025-07-08

## 2025-07-08 NOTE — CARE COORDINATION
Care Transitions Note    Initial Call - Call within 2 business days of discharge: Yes    Patient Current Location:  [place]    Care Transition Nurse contacted the patient by telephone to perform post hospital discharge assessment, verified name and  as identifiers.  Provided introduction to self, and explanation of the Care Transition Nurse role.    Patient: Gume Delacruz III    Patient : 1970   MRN: [id number]    Reason for Admission: cellulitis   Discharge Date: 25  RURS: Readmission Risk Score: 4.4      Last Discharge Facility       Date Complaint Diagnosis Description Type Department Provider    25 Foot Pain Cellulitis of right lower extremity ED to Hosp-Admission (Discharged) (ADMITTED) [id number] Ester Hair MD; Dion,...            Was this an external facility discharge? No    Additional needs identified to be addressed with provider   No needs identified             Method of communication with provider: none.      Care Summary Note: Able to reach pt. Discussed IP stay. Pt admitted to [facility] from -2025 for dx cellulitis. Pt reports being bitten by a bug while hiking. Pt reports he was able to  medications and taking as ordered. Pt aware to complete all ABT as ordered. Pt has PCP follow up set for 2025. Discussed ACM role. Pt politely declined ACM needs. Will sign off.     No further outreach scheduled with this ACM, ACM will sign off care team at this time. Program status updated to reflect closed.  Patient  has this ACM's contact information if future needs arise.      Remote Patient Monitoring:  Offered patient enrollment in the Remote Patient Monitoring (RPM) program for in-home monitoring: Patient is not eligible for RPM program because: insurance coverage.      Follow Up Appointment:   Discussed follow up appointments. Patient has hospital follow up appointment scheduled within 14 days of discharge.   Future Appointments         Provider

## 2025-07-11 SDOH — ECONOMIC STABILITY: FOOD INSECURITY: WITHIN THE PAST 12 MONTHS, THE FOOD YOU BOUGHT JUST DIDN'T LAST AND YOU DIDN'T HAVE MONEY TO GET MORE.: NEVER TRUE

## 2025-07-11 SDOH — ECONOMIC STABILITY: FOOD INSECURITY: WITHIN THE PAST 12 MONTHS, YOU WORRIED THAT YOUR FOOD WOULD RUN OUT BEFORE YOU GOT MONEY TO BUY MORE.: NEVER TRUE

## 2025-07-11 SDOH — ECONOMIC STABILITY: TRANSPORTATION INSECURITY
IN THE PAST 12 MONTHS, HAS THE LACK OF TRANSPORTATION KEPT YOU FROM MEDICAL APPOINTMENTS OR FROM GETTING MEDICATIONS?: NO

## 2025-07-11 SDOH — ECONOMIC STABILITY: INCOME INSECURITY: IN THE LAST 12 MONTHS, WAS THERE A TIME WHEN YOU WERE NOT ABLE TO PAY THE MORTGAGE OR RENT ON TIME?: NO

## 2025-07-14 ENCOUNTER — OFFICE VISIT (OUTPATIENT)
Age: 55
End: 2025-07-14
Payer: COMMERCIAL

## 2025-07-14 VITALS
RESPIRATION RATE: 16 BRPM | BODY MASS INDEX: 28.4 KG/M2 | DIASTOLIC BLOOD PRESSURE: 80 MMHG | SYSTOLIC BLOOD PRESSURE: 142 MMHG | TEMPERATURE: 97.3 F | HEIGHT: 68 IN | OXYGEN SATURATION: 99 % | HEART RATE: 72 BPM | WEIGHT: 187.4 LBS

## 2025-07-14 DIAGNOSIS — R03.0 ELEVATED BP WITHOUT DIAGNOSIS OF HYPERTENSION: ICD-10-CM

## 2025-07-14 DIAGNOSIS — L03.115 CELLULITIS OF RIGHT LOWER EXTREMITY: Primary | ICD-10-CM

## 2025-07-14 PROCEDURE — 99213 OFFICE O/P EST LOW 20 MIN: CPT | Performed by: INTERNAL MEDICINE

## 2025-07-14 ASSESSMENT — ENCOUNTER SYMPTOMS
ABDOMINAL PAIN: 0
DIARRHEA: 0
CONSTIPATION: 0
NAUSEA: 0
COUGH: 0
VOMITING: 0
SHORTNESS OF BREATH: 0

## 2025-07-14 ASSESSMENT — PATIENT HEALTH QUESTIONNAIRE - PHQ9
SUM OF ALL RESPONSES TO PHQ QUESTIONS 1-9: 0
4. FEELING TIRED OR HAVING LITTLE ENERGY: NOT AT ALL
1. LITTLE INTEREST OR PLEASURE IN DOING THINGS: NOT AT ALL
SUM OF ALL RESPONSES TO PHQ QUESTIONS 1-9: 0
3. TROUBLE FALLING OR STAYING ASLEEP: NOT AT ALL
9. THOUGHTS THAT YOU WOULD BE BETTER OFF DEAD, OR OF HURTING YOURSELF: NOT AT ALL
SUM OF ALL RESPONSES TO PHQ QUESTIONS 1-9: 0
2. FEELING DOWN, DEPRESSED OR HOPELESS: NOT AT ALL
SUM OF ALL RESPONSES TO PHQ QUESTIONS 1-9: 0
8. MOVING OR SPEAKING SO SLOWLY THAT OTHER PEOPLE COULD HAVE NOTICED. OR THE OPPOSITE, BEING SO FIGETY OR RESTLESS THAT YOU HAVE BEEN MOVING AROUND A LOT MORE THAN USUAL: NOT AT ALL
6. FEELING BAD ABOUT YOURSELF - OR THAT YOU ARE A FAILURE OR HAVE LET YOURSELF OR YOUR FAMILY DOWN: NOT AT ALL
7. TROUBLE CONCENTRATING ON THINGS, SUCH AS READING THE NEWSPAPER OR WATCHING TELEVISION: NOT AT ALL
5. POOR APPETITE OR OVEREATING: NOT AT ALL

## 2025-07-14 NOTE — PROGRESS NOTES
Gume Delacruz III is a 55 y.o. male who was seen in clinic today (7/14/2025) for a Transitional Care Appointment.     Assessment & Plan:   Below is the assessment and plan developed based on review of pertinent history, physical exam, labs, studies, and medications.  Assessment & Plan  Cellulitis  Assessment: New diagnosis, has resolved, nothing to suggest active infection.  Treatment plan: No changes. Avoid picking scabs. Reviewed duration for skin changes to resolve.      Elevated BP  Assessment: New diagnosis, BP was well controlled at last visit in October 2024 and in the ER. He has follow up in October and will re-evaluate at that time.       1. Cellulitis of right lower extremity  2. Elevated BP without diagnosis of hypertension     Return if symptoms worsen or fail to improve.   Subjective & Objective:   Gume was seen today for Follow-Up from Hospital      Transition Care Management:    Date of office visit: 7/14/2025   Date of admission: 7/5/25  Date of discharge: 7/6/25  Hospital: [facility]    Call initiated w/i 2 business dates of discharge: Yes  Date of the most recent call to the patient: 07/08/2025       History of Present Illness  Gume Delacruz III is a 55 y.o. male presenting today for follow-up after hospital discharge. This encounter and supporting documentation was reviewed if available. Medication reconciliation was performed today. The main problem requiring admission was cellulitis of the right leg. Initially started on Cefepime and Vancomycin. Discharged with doxycyline and amoxicillin. Complications during admission: none. Admitting symptoms have: significantly improved    There have been no side effects from the antibiotics, and the leg condition has significantly improved with the swelling subsiding last Thursday. Initially, the entire foot was swollen with blisters. The patient was bitten by an insect on Monday (6/30) and went hiking on Wednesday. On 07/04/2025 he noticed a

## 2025-07-24 ENCOUNTER — ANESTHESIA EVENT (OUTPATIENT)
Facility: HOSPITAL | Age: 55
End: 2025-07-24
Payer: COMMERCIAL

## 2025-07-24 ENCOUNTER — ANESTHESIA (OUTPATIENT)
Facility: HOSPITAL | Age: 55
End: 2025-07-24
Payer: COMMERCIAL

## 2025-07-24 ENCOUNTER — HOSPITAL ENCOUNTER (OUTPATIENT)
Facility: HOSPITAL | Age: 55
Setting detail: OUTPATIENT SURGERY
Discharge: HOME | End: 2025-07-24
Attending: INTERNAL MEDICINE | Admitting: INTERNAL MEDICINE
Payer: COMMERCIAL

## 2025-07-24 VITALS
HEART RATE: 72 BPM | SYSTOLIC BLOOD PRESSURE: 124 MMHG | WEIGHT: 183 LBS | OXYGEN SATURATION: 94 % | RESPIRATION RATE: 17 BRPM | TEMPERATURE: 97 F | HEIGHT: 69 IN | BODY MASS INDEX: 27.11 KG/M2 | DIASTOLIC BLOOD PRESSURE: 79 MMHG

## 2025-07-24 PROCEDURE — 7100000010 HC PHASE II RECOVERY - FIRST 15 MIN: Performed by: INTERNAL MEDICINE

## 2025-07-24 PROCEDURE — 2720000010 HC SURG SUPPLY STERILE: Performed by: INTERNAL MEDICINE

## 2025-07-24 PROCEDURE — 2709999900 HC NON-CHARGEABLE SUPPLY: Performed by: INTERNAL MEDICINE

## 2025-07-24 PROCEDURE — 2580000003 HC RX 258: Performed by: NURSE ANESTHETIST, CERTIFIED REGISTERED

## 2025-07-24 PROCEDURE — 7100000011 HC PHASE II RECOVERY - ADDTL 15 MIN: Performed by: INTERNAL MEDICINE

## 2025-07-24 PROCEDURE — 3700000000 HC ANESTHESIA ATTENDED CARE: Performed by: INTERNAL MEDICINE

## 2025-07-24 PROCEDURE — 88305 TISSUE EXAM BY PATHOLOGIST: CPT

## 2025-07-24 PROCEDURE — 6360000002 HC RX W HCPCS: Performed by: NURSE ANESTHETIST, CERTIFIED REGISTERED

## 2025-07-24 PROCEDURE — 3600007502: Performed by: INTERNAL MEDICINE

## 2025-07-24 PROCEDURE — 3600007512: Performed by: INTERNAL MEDICINE

## 2025-07-24 PROCEDURE — 3700000001 HC ADD 15 MINUTES (ANESTHESIA): Performed by: INTERNAL MEDICINE

## 2025-07-24 RX ORDER — SODIUM CHLORIDE 9 MG/ML
INJECTION, SOLUTION INTRAVENOUS
Status: DISCONTINUED | OUTPATIENT
Start: 2025-07-24 | End: 2025-07-24 | Stop reason: SDUPTHER

## 2025-07-24 RX ORDER — SODIUM CHLORIDE 0.9 % (FLUSH) 0.9 %
5-40 SYRINGE (ML) INJECTION PRN
Status: DISCONTINUED | OUTPATIENT
Start: 2025-07-24 | End: 2025-07-24 | Stop reason: HOSPADM

## 2025-07-24 RX ORDER — SODIUM CHLORIDE 0.9 % (FLUSH) 0.9 %
5-40 SYRINGE (ML) INJECTION EVERY 12 HOURS SCHEDULED
Status: DISCONTINUED | OUTPATIENT
Start: 2025-07-24 | End: 2025-07-24 | Stop reason: HOSPADM

## 2025-07-24 RX ORDER — SODIUM CHLORIDE 9 MG/ML
INJECTION, SOLUTION INTRAVENOUS CONTINUOUS
Status: DISCONTINUED | OUTPATIENT
Start: 2025-07-24 | End: 2025-07-24 | Stop reason: HOSPADM

## 2025-07-24 RX ORDER — SODIUM CHLORIDE 9 MG/ML
INJECTION, SOLUTION INTRAVENOUS PRN
Status: DISCONTINUED | OUTPATIENT
Start: 2025-07-24 | End: 2025-07-24 | Stop reason: HOSPADM

## 2025-07-24 RX ADMIN — PROPOFOL 100 MG: 10 INJECTION, EMULSION INTRAVENOUS at 11:43

## 2025-07-24 RX ADMIN — PROPOFOL 100 MG: 10 INJECTION, EMULSION INTRAVENOUS at 11:28

## 2025-07-24 RX ADMIN — PROPOFOL 100 MG: 10 INJECTION, EMULSION INTRAVENOUS at 11:35

## 2025-07-24 RX ADMIN — SODIUM CHLORIDE: 9 INJECTION, SOLUTION INTRAVENOUS at 11:25

## 2025-07-24 RX ADMIN — PROPOFOL 50 MG: 10 INJECTION, EMULSION INTRAVENOUS at 11:53

## 2025-07-24 ASSESSMENT — PAIN - FUNCTIONAL ASSESSMENT: PAIN_FUNCTIONAL_ASSESSMENT: 0-10

## 2025-07-24 NOTE — ANESTHESIA POSTPROCEDURE EVALUATION
Department of Anesthesiology  Postprocedure Note    Patient: Gume Delacruz III  MRN: [id number]  YOB: 1970  Date of evaluation: 7/24/2025    Procedure Summary       Date: 07/24/25 Room / Location: [facility] ENDO 01 / [facility] ENDOSCOPY    Anesthesia Start: 1125 Anesthesia Stop: 1158    Procedures:       ESOPHAGOGASTRODUODENOSCOPY (Upper GI Region)      COLONOSCOPY DIAGNOSTIC (Lower GI Region) Diagnosis:       Special screening for malignant neoplasms, colon      Dysphagia, unspecified type      Gastroesophageal reflux disease, unspecified whether esophagitis present      (Special screening for malignant neoplasms, colon [Z12.11])      (Dysphagia, unspecified type [R13.10])      (Gastroesophageal reflux disease, unspecified whether esophagitis present [K21.9])    Surgeons: Efe Bales MD Responsible Provider: Isabel Lyn MD    Anesthesia Type: MAC ASA Status: 2            Anesthesia Type: MAC    Patricia Phase I: Patricia Score: 10    Patricia Phase II: Patricia Score: 10    Anesthesia Post Evaluation    Level of consciousness: awake  Airway patency: patent  Nausea & Vomiting: no nausea  Cardiovascular status: hemodynamically stable  Respiratory status: acceptable  Hydration status: euvolemic  Pain management: adequate        No notable events documented.

## 2025-07-24 NOTE — OP NOTE
ANAIS Bales M.D.  [phone]               Esophagogastroduodenoscopy (EGD) Procedure Note    NAME: Gume Delacruz III  :  1970  MRN:  [id number]    Indications: GERD, dysphagia    : Efe Bales MD    Referring Provider:  Gumaro Day MD    Staff: Circulator: Delphine Remy RN  Endoscopy Technician: Ivory Wright    Prosthetic devices, grafts, tissues, transplant, or devices implanted: none    Medicine:  MAC anesthesia      Procedure Details:  After informed consent was obtained with all risks and benefits of the procedure explained and preprocedure exam completed, the patient was placed in the left lateral decubitus position.  Universal protocol for patient identification was performed and documented in the nursing notes.  Throughout the procedure, the patient's blood pressure was monitored at least every five minutes; pulse, and oxygen saturations were monitored continuously.  All vital signs were documented in the nursing notes.  The endoscope was inserted into the mouth and advanced under direct vision to second portion of the duodenum.  A careful inspection was made as the gastroscope was withdrawn, including a retroflexed view of the proximal stomach; findings and interventions are described below.      Findings:   Esophagus: Normal proximal and mid esophagus status post biopsies, LA grade A reflux esophagitis overlying a moderate Schatzki's ring status post biopsies  Stomach: 3 cm sliding hiatal hernia, mild linear erythema in the body status post biopsies there and throughout the stomach  Duodenum: Normal    Interventions:    biopsy of esophagus and stomach    Specimens:   ID Type Source Tests Collected by Time Destination   1 : Gastric bx Tissue Gastric SURGICAL PATHOLOGY Efe Bales MD 2025 1134    2 : GE junction bx Tissue GE Junction Biopsy SURGICAL PATHOLOGY Efe Bales MD 2025 1130    3 : Proximal esophagus bx Tissue 
Proximal esophagus bx Tissue Esophagus SURGICAL PATHOLOGY Efe Bales MD 7/24/2025 1141        EBL:  None.    Complications:   No immediate complications     Impression:  -See above    Recommendations:   -For colon cancer screening in this average-risk patient, colonoscopy may be repeated in 10 years.   Resume normal medication(s).       Signed by: Efe Bales MD          7/24/2025  12:12 PM

## 2025-07-24 NOTE — ANESTHESIA PRE PROCEDURE
Department of Anesthesiology  Preprocedure Note       Name:  Gume Delacruz III   Age:  55 y.o.  :  1970                                          MRN:  [id number]         Date:  2025      Surgeon: Surgeon(s):  Efe Bales MD    Procedure: Procedure(s):  ESOPHAGOGASTRODUODENOSCOPY  COLONOSCOPY DIAGNOSTIC    Medications prior to admission:   Prior to Admission medications    Not on File       Current medications:    No current facility-administered medications for this encounter.       Allergies:  No Known Allergies    Problem List:    Patient Active Problem List   Diagnosis Code    Anxiety F41.9    Recurrent major depressive disorder, in full remission F33.42    Pityriasis versicolor B36.0    Cellulitis L03.90       Past Medical History:        Diagnosis Date    Anxiety     Depression     GERD (gastroesophageal reflux disease)     Kidney stones     Psychiatric disorder     Depression       Past Surgical History:        Procedure Laterality Date    LITHOTRIPSY Left 2020    TONSILLECTOMY      WISDOM TOOTH EXTRACTION         Social History:    Social History     Tobacco Use    Smoking status: Never    Smokeless tobacco: Former     Quit date: 10/16/2008   Substance Use Topics    Alcohol use: Yes     Alcohol/week: 4.0 standard drinks of alcohol     Types: 4 Drinks containing 0.5 oz of alcohol per week                                Counseling given: Not Answered      Vital Signs (Current): There were no vitals filed for this visit.                                           BP Readings from Last 3 Encounters:   25 (!) 142/80   25 101/77   10/01/24 123/84       NPO Status: Time of last liquid consumption: 600                        Time of last solid consumption:                         Date of last liquid consumption: 25                        Date of last solid food consumption: 25    BMI:   Wt Readings from Last 3 Encounters:   25 85 kg (187 lb 6.4 oz)   25 87 kg

## 2025-07-24 NOTE — H&P
[address] [address]          Pre-procedure History and Physical       NAME:  Gume Delacruz III   :   1970   MRN:   [id number]     CHIEF COMPLAINT/HPI: GERD, dysphagia, colorectal cancer screening    PMH:  Past Medical History:   Diagnosis Date    Anxiety     Depression     GERD (gastroesophageal reflux disease)     Kidney stones     Psychiatric disorder     Depression       PSH:  Past Surgical History:   Procedure Laterality Date    LITHOTRIPSY Left 2020    TONSILLECTOMY      WISDOM TOOTH EXTRACTION         Allergies:  No Known Allergies    Home Medications:  None       Hospital Medications:  No current facility-administered medications for this encounter.       Family History:  Family History   Problem Relation Age of Onset    Breast Cancer Mother     Ulcerative Colitis Father 84    Kidney stones Father     Kidney Disease Father         Stones    No Known Problems Sister     Diabetes Maternal Grandfather     Colon Cancer Maternal Grandfather     Alcohol Abuse Paternal Grandmother     Alcohol Abuse Paternal Grandfather     Anxiety Disorder Daughter     Kidney stones Daughter     Anesth Problems Neg Hx        Social History:  Social History     Tobacco Use    Smoking status: Never    Smokeless tobacco: Former     Quit date: 10/16/2008   Substance Use Topics    Alcohol use: Yes     Alcohol/week: 4.0 standard drinks of alcohol     Types: 4 Drinks containing 0.5 oz of alcohol per week         PHYSICAL EXAM PRIOR TO SEDATION:  General: Alert, in no acute distress    Lungs:            CTA bilaterally  Heart:  Normal S1, S2    Abdomen: Soft, Non distended, Non tender.  Normoactive bowel sounds.      Assessment:   Stable for sedation administration.  Date of last colonoscopy: None, Polyps  No    Plan:     Endoscopic procedure with sedation     Signed By: Efe Bales MD     2025  11:25 AM

## 2025-07-24 NOTE — DISCHARGE INSTRUCTIONS
ANAIS HIGGINS [facility]  fEe Bales M.D.  [address], [place] [address] [phone]                      EGD/COLONOSCOPY DISCHARGE INSTRUCTIONS    Gume Delacruz III  [id number]  1970    DISCOMFORT:  Redness at IV site- apply warm compress to area; if redness or soreness persist- contact your physician  There may be a slight amount of blood passed from the rectum  Gaseous discomfort- walking, belching will help relieve any discomfort  You may not operate a vehicle for 12 hours  You may not  engage in an occupation involving machinery or appliances for rest of today  You may not  drink alcoholic beverages for at least 12 hours  Avoid making any critical decisions for at least 24 hour    DIET:   You may resume your normal diet, but some patients find that heavy or large  meals may lead to indigestion or vomiting. I suggest a light meal as first food  Intake. I recommend a whole food, plant-based diet for your overall health.    ACTIVITY:  You may resume your normal daily activities.  It is recommended that you spend the remainder of the day resting - avoid any strenuous activity.    CALL M.D.  ANY SIGN OF   Increasing pain, nausea, vomiting  Abdominal distension (swelling)  New increased bleeding (oral or rectal)  Fever (chills)  Pain in chest area  Bloody discharge from nose or mouth  Shortness of breath    Additional Instructions:   Call Dr. Bales if any questions or problems at [phone]   You should receive the biopsy results by phone or mail within 3 weeks, if not, call my office for the results.      Should have a repeat colonoscopy in 10 years. EGD showed mild esophagitis overlying a benign-appearing Schatzki's ring due to a small to moderate hiatal hernia, mild stomach redness likely acid related.  Biopsies taken throughout.  I will send a prescription for pantoprazole 40 mg daily.  Colonoscopy showed mild diverticulosis.

## (undated) DEVICE — ORISE PROKNIFE 3.0 MM ELECTRODE: Brand: ORISE™ PROKNIFE

## (undated) DEVICE — DEVON™ KNEE AND BODY STRAP 60" X 3" (1.5 M X 7.6 CM): Brand: DEVON

## (undated) DEVICE — INFECTION CONTROL KIT SYS

## (undated) DEVICE — ORISE PROKNIFE 1.5 MM ELECTRODE: Brand: ORISE™ PROKNIFE

## (undated) DEVICE — KENDALL SCD EXPRESS SLEEVES, KNEE LENGTH, MEDIUM: Brand: KENDALL SCD

## (undated) DEVICE — SUPPLEMENT DIGESTIVE H2O SOL GI-EASE

## (undated) DEVICE — FORCEP BX JUMBO 4 2.8 MMX240 CM 3.2 MM OVL CUP RADIAL JAW